# Patient Record
Sex: FEMALE | Race: BLACK OR AFRICAN AMERICAN | Employment: UNEMPLOYED | URBAN - METROPOLITAN AREA
[De-identification: names, ages, dates, MRNs, and addresses within clinical notes are randomized per-mention and may not be internally consistent; named-entity substitution may affect disease eponyms.]

---

## 2022-10-07 ENCOUNTER — OFFICE VISIT (OUTPATIENT)
Dept: ORTHOPEDIC SURGERY | Age: 47
End: 2022-10-07
Payer: MEDICARE

## 2022-10-07 VITALS — BODY MASS INDEX: 31.34 KG/M2 | WEIGHT: 195 LBS | HEIGHT: 66 IN

## 2022-10-07 DIAGNOSIS — M17.0 OSTEOARTHRITIS OF BOTH KNEES, UNSPECIFIED OSTEOARTHRITIS TYPE: ICD-10-CM

## 2022-10-07 DIAGNOSIS — M25.562 PAIN IN BOTH KNEES, UNSPECIFIED CHRONICITY: Primary | ICD-10-CM

## 2022-10-07 DIAGNOSIS — M25.561 PAIN IN BOTH KNEES, UNSPECIFIED CHRONICITY: Primary | ICD-10-CM

## 2022-10-07 PROCEDURE — 20611 DRAIN/INJ JOINT/BURSA W/US: CPT | Performed by: ORTHOPAEDIC SURGERY

## 2022-10-07 PROCEDURE — G8432 DEP SCR NOT DOC, RNG: HCPCS | Performed by: ORTHOPAEDIC SURGERY

## 2022-10-07 PROCEDURE — 99203 OFFICE O/P NEW LOW 30 MIN: CPT | Performed by: ORTHOPAEDIC SURGERY

## 2022-10-07 PROCEDURE — G8427 DOCREV CUR MEDS BY ELIG CLIN: HCPCS | Performed by: ORTHOPAEDIC SURGERY

## 2022-10-07 PROCEDURE — G8417 CALC BMI ABV UP PARAM F/U: HCPCS | Performed by: ORTHOPAEDIC SURGERY

## 2022-10-07 RX ORDER — TRIAMCINOLONE ACETONIDE 40 MG/ML
40 INJECTION, SUSPENSION INTRA-ARTICULAR; INTRAMUSCULAR ONCE
Status: COMPLETED | OUTPATIENT
Start: 2022-10-07 | End: 2022-10-07

## 2022-10-07 RX ORDER — LIDOCAINE HYDROCHLORIDE 10 MG/ML
9 INJECTION INFILTRATION; PERINEURAL ONCE
Status: COMPLETED | OUTPATIENT
Start: 2022-10-07 | End: 2022-10-07

## 2022-10-07 RX ORDER — DICLOFENAC SODIUM 10 MG/G
GEL TOPICAL
COMMUNITY
Start: 2022-09-07

## 2022-10-07 RX ADMIN — TRIAMCINOLONE ACETONIDE 40 MG: 40 INJECTION, SUSPENSION INTRA-ARTICULAR; INTRAMUSCULAR at 16:00

## 2022-10-07 RX ADMIN — LIDOCAINE HYDROCHLORIDE 9 ML: 10 INJECTION INFILTRATION; PERINEURAL at 16:00

## 2022-10-07 NOTE — LETTER
Patricia Louis   1975   [id number]       10/7/2022       I hereby authorize and direct Billy Cruz MD, Niya Cho, and whomever he may designate as his associate to perform upon myself the following procedure:    Injection of: Kenalog,  BILAT KNEE #3  If any unforeseen condition arises in the course of the procedure, I further authorize him and his associated and/or assistant(s) to do whatever he/she deems advisable. The nature, purpose, benefits, risks, side effects, likelihood of achieving goals, and potential problems that might occur during recuperation, risks for not receiving the proposed care, treatment and services and alternatives of the procedure have been fully explained to me by my physician including, but not limited to:    Swelling, joint pain, skin pigment changes, worsening of condition, and failure to improve. I acknowledge that no guarantee or assurance has been made to me as to the results that may be obtained or the likelihood of success.                 _______________________________________     Signature of patient or authorized representative                [employer] [facility] fax: [phone]

## 2022-10-07 NOTE — PROGRESS NOTES
Name: Elda Blanco    : 1975     Service Dept: [address] [place] [address] and Sports Medicine    Chief Complaint   Patient presents with    Knee Pain        Visit Vitals  Ht 5' 6\" (1.676 m)   Wt 195 lb (88.5 kg)   BMI 31.47 kg/m²        Allergies   Allergen Reactions    Cymbalta [Duloxetine] Nausea and Vomiting        Current Outpatient Medications   Medication Sig Dispense Refill    diclofenac (VOLTAREN) 1 % gel APPLY 4 GRAMS TOPICALLY TO AFFECTED AREA 4 TIMES DAILY      oxyCODONE IR (ROXICODONE) 10 mg tab immediate release tablet Take  by mouth.      pregabalin (LYRICA) 50 mg capsule Take  by mouth.      ergocalciferol (ERGOCALCIFEROL) 50,000 unit capsule Take 50,000 Units by mouth.      zolpidem (AMBIEN) 10 mg tablet Take  by mouth nightly as needed for Sleep. potassium 99 mg tablet Take 99 mg by mouth daily. Current Facility-Administered Medications   Medication Dose Route Frequency Provider Last Rate Last Admin    [COMPLETED] lidocaine (XYLOCAINE) 10 mg/mL (1 %) injection 9 mL  9 mL Other ONCE Billy Strickland MD   9 mL at 10/07/22 1600    [COMPLETED] triamcinolone acetonide (KENALOG-40) 40 mg/mL injection 40 mg  40 mg Intra artICUlar ONCE Billy Strickland MD   40 mg at 10/07/22 1600    [COMPLETED] lidocaine (XYLOCAINE) 10 mg/mL (1 %) injection 9 mL  9 mL Other ONCE Billy Strickland MD   9 mL at 10/07/22 1600    [COMPLETED] triamcinolone acetonide (KENALOG-40) 40 mg/mL injection 40 mg  40 mg Intra artICUlar ONCE Billy Strickland MD   40 mg at 10/07/22 1600      There is no problem list on file for this patient.      Family History   Problem Relation Age of Onset    Cancer Father         prostate    Other Mother         graves    High Cholesterol Mother     OSTEOARTHRITIS Mother     OSTEOARTHRITIS Sister     OSTEOARTHRITIS Father     Thyroid Disease Mother     Rashes/Skin Problems Sister       Social History     Socioeconomic History    Marital status:    Tobacco Use Smoking status: Former     Types: Cigarettes     Quit date: 1/1/2012     Years since quitting: 10.7    Smokeless tobacco: Never   Substance and Sexual Activity    Alcohol use: Yes     Alcohol/week: 0.0 standard drinks     Comment: socially    Drug use: No    Sexual activity: Yes     Partners: Male     Birth control/protection: None      Past Surgical History:   Procedure Laterality Date    HX HYSTERECTOMY  2010      Past Medical History:   Diagnosis Date    Arthritis         I have reviewed and agree with [address] [facility] and ROS and intake form in chart and the record furthermore I have reviewed prior medical record(s) regarding this patients care during this appointment. Review of Systems:   Patient is a pleasant appearing individual, appropriately dressed, well hydrated, well nourished, who is alert, appropriately oriented for age, and in no acute distress with a normal gait and normal affect who does not appear to be in any significant pain. Physical Exam:  Left Knee -Decrease range of motion with flexion, Knee arc of greater than 50 degrees, Some crepitation, Grossly neurovascularly intact, Good cap refill, No skin lesion, Moderate swelling, some gross instability, Some quadriceps weakness Kellgren and Tera at least grade 3    Right Knee -Decrease range of motion with flexion, Some crepitation, Grossly neurovascularly intact, Good cap refill, No skin lesion, Moderate swelling, some gross instability, Some quadriceps weaknessKellgren and Tera at least grade 3     Procedure Documentation:    I discussed in detail the risks, benefits and complications of an injection which included but are not limited to infection, skin reactions, hot swollen joint, and anaphylaxis with the patient. The patient verbalized understanding and gave informed consent for the injection. The patient's knees were flexed to 90° and the skin prepped using sterile alcohol solution.  A sterile needle was inserted into the bilateral knee(s) and the mixture of 9 mL Lidocaine 1%, 1 mL Kenalog 40 mg was injected under sterile technique. The needle was withdrawn and the puncture site sealed with a Band-Aid. Technique: Under sterile conditions a [employer] ultrasound unit with a variable frequency (7.0-14.0 MHz) linear transducer was used to localize the placement of needle into the bilateral knee(s) joint. Findings: Successful needle placement for knee injection. Final images were taken and saved for permanent record. The patient tolerated the injection well. The patient was instructed to call the office immediately if there is any pain, redness, warmth, fever, or chills. Encounter Diagnoses     ICD-10-CM ICD-9-CM   1. Pain in both knees, unspecified chronicity  M25.561 719.46    M25.562    2. Osteoarthritis of both knees, unspecified osteoarthritis type  M17.0 715.96       HPI:  The patient is here with a chief complaint of bilateral knee pain, throbbing burning pain, progressively getting worse. Pain is 9/10. X-rays of the bilateral knees are positive for OA, severe in nature. Assessment/Plan:  Plan would be for cortisone injection in both knees. We will see her back in a few weeks. If she is not better, we will consider viscosupplementation. If that does not help, she will be a candidate for knee replacement even though she is slightly young, and we will go from there. As part of continued conservative pain management options the patient was advised to utilize Tylenol or OTC NSAIDS as long as it is not medically contraindicated. Return to Office: Follow-up and Dispositions    Return in about 6 weeks (around 11/18/2022).         Administrations This Visit       lidocaine (XYLOCAINE) 10 mg/mL (1 %) injection 9 mL       Admin Date  10/07/2022 Action  Given Dose  9 mL Route  Other Administered By  Yoanna Johnson LPN      Admin Date  10/07/2022 Action  Given Dose  9 mL Route  Other Administered By  Yoanna Johnson LPN triamcinolone acetonide (KENALOG-40) 40 mg/mL injection 40 mg       Admin Date  10/07/2022 Action  Given Dose  40 mg Route  Intra artICUlar Administered By  Alejandra Aquino LPN      Admin Date  10/07/2022 Action  Given Dose  40 mg Route  Intra artICUlar Administered By  Alejandra Aquino LPN                   Scribed by Niko Mcmillan LPN as dictated by [facility] OLIVIA Hager MD.  Documentation True and Accepted Billy Hager MD

## 2022-10-07 NOTE — Clinical Note
10/10/2022    Patient: Joshua Gatica   YOB: 1975   Date of Visit: 10/7/2022     Lucas Pimentel DO  Via     Dear Lucas Pimentel DO,      Thank you for referring Ms. Emanuel Silvestre to [address] [place] [address] for evaluation. My notes for this consultation are attached. If you have questions, please do not hesitate to call me. I look forward to following your patient along with you.       Sincerely,    Jerome Andrade MD

## 2022-10-21 ENCOUNTER — OFFICE VISIT (OUTPATIENT)
Dept: ORTHOPEDIC SURGERY | Age: 47
End: 2022-10-21
Payer: MEDICARE

## 2022-10-21 DIAGNOSIS — M17.0 OSTEOARTHRITIS OF BOTH KNEES, UNSPECIFIED OSTEOARTHRITIS TYPE: Primary | ICD-10-CM

## 2022-10-21 PROCEDURE — 99213 OFFICE O/P EST LOW 20 MIN: CPT | Performed by: ORTHOPAEDIC SURGERY

## 2022-10-21 PROCEDURE — G8432 DEP SCR NOT DOC, RNG: HCPCS | Performed by: ORTHOPAEDIC SURGERY

## 2022-10-21 PROCEDURE — G8417 CALC BMI ABV UP PARAM F/U: HCPCS | Performed by: ORTHOPAEDIC SURGERY

## 2022-10-21 PROCEDURE — G8427 DOCREV CUR MEDS BY ELIG CLIN: HCPCS | Performed by: ORTHOPAEDIC SURGERY

## 2022-10-21 RX ORDER — VALACYCLOVIR HYDROCHLORIDE 500 MG/1
TABLET, FILM COATED ORAL
COMMUNITY
Start: 2022-10-13 | End: 2022-11-11 | Stop reason: ALTCHOICE

## 2022-10-21 NOTE — PROGRESS NOTES
Name: Lissett Solomon    : 1975     Service Dept: [address] [facility] and Sports Medicine    Chief Complaint   Patient presents with    Knee Pain        There were no vitals taken for this visit. Allergies   Allergen Reactions    Cymbalta [Duloxetine] Nausea and Vomiting        Current Outpatient Medications   Medication Sig Dispense Refill    valACYclovir (VALTREX) 500 mg tablet TAKE 1 TABLET BY MOUTH TWICE A DAY FOR 5 DAYS      diclofenac (VOLTAREN) 1 % gel APPLY 4 GRAMS TOPICALLY TO AFFECTED AREA 4 TIMES DAILY      oxyCODONE IR (ROXICODONE) 10 mg tab immediate release tablet Take  by mouth.      pregabalin (LYRICA) 50 mg capsule Take  by mouth.      ergocalciferol (ERGOCALCIFEROL) 50,000 unit capsule Take 50,000 Units by mouth.      zolpidem (AMBIEN) 10 mg tablet Take  by mouth nightly as needed for Sleep. potassium 99 mg tablet Take 99 mg by mouth daily. There is no problem list on file for this patient. Family History   Problem Relation Age of Onset    Cancer Father         prostate    Other Mother         graves    High Cholesterol Mother     OSTEOARTHRITIS Mother     OSTEOARTHRITIS Sister     OSTEOARTHRITIS Father     Thyroid Disease Mother     Rashes/Skin Problems Sister       Social History     Socioeconomic History    Marital status:    Tobacco Use    Smoking status: Former     Types: Cigarettes     Quit date: 2012     Years since quitting: 10.8    Smokeless tobacco: Never   Substance and Sexual Activity    Alcohol use:  Yes     Alcohol/week: 0.0 standard drinks     Comment: socially    Drug use: No    Sexual activity: Yes     Partners: Male     Birth control/protection: None      Past Surgical History:   Procedure Laterality Date    HX HYSTERECTOMY        Past Medical History:   Diagnosis Date    Arthritis         I have reviewed and agree with [address] [facility] and YUDY and intake form in chart and the record furthermore I have reviewed prior medical record(s) regarding this patients care during this appointment. Review of Systems:   Patient is a pleasant appearing individual, appropriately dressed, well hydrated, well nourished, who is alert, appropriately oriented for age, and in no acute distress with a normal gait and normal affect who does not appear to be in any significant pain. Physical Exam:  Left Knee -Decrease range of motion with flexion, Knee arc of greater than 50 degrees, Some crepitation, Grossly neurovascularly intact, Good cap refill, No skin lesion, Moderate swelling, some gross instability, Some quadriceps weakness Kellgren and Tera at least grade 3    Right Knee -Decrease range of motion with flexion, Some crepitation, Grossly neurovascularly intact, Good cap refill, No skin lesion, Moderate swelling, some gross instability, Some quadriceps weaknessKellgren and Tera at least grade 3    Encounter Diagnoses     ICD-10-CM ICD-9-CM   1. Osteoarthritis of both knees, unspecified osteoarthritis type  M17.0 715.96       HPI:  The patient is here with a chief complaint of bilateral knee pain, diagnosed with severe OA. Disproportionate for age. Pain is 8/10. Shots did not help. Assessment/Plan:  Plan at this point, we will get her approved for Supartz approval.  If that does not help, we talk about possible evaluation for knee replacement and go from there. As part of continued conservative pain management options the patient was advised to utilize Tylenol or OTC NSAIDS as long as it is not medically contraindicated. Return to Office: Follow-up and Dispositions    Return for post supartz approval.           Scribed by He García LPN as dictated by [facility] [place] OLIVIA Ortega MD.  Documentation True and Accepted Billy Ortega MD

## 2022-10-21 NOTE — PATIENT INSTRUCTIONS

## 2022-10-21 NOTE — Clinical Note
10/24/2022    Patient: Tiffany Montgomery   YOB: 1975   Date of Visit: 10/21/2022     Dario Castillo DO  Via     Dear Dario Castillo DO,      Thank you for referring Ms. Trudy Andrade to [address] [place] [address] for evaluation. My notes for this consultation are attached. If you have questions, please do not hesitate to call me. I look forward to following your patient along with you.       Sincerely,    Irma Cruz MD

## 2022-11-11 ENCOUNTER — OFFICE VISIT (OUTPATIENT)
Dept: ORTHOPEDIC SURGERY | Age: 47
End: 2022-11-11
Payer: MEDICARE

## 2022-11-11 DIAGNOSIS — M17.0 OSTEOARTHRITIS OF BOTH KNEES, UNSPECIFIED OSTEOARTHRITIS TYPE: Primary | ICD-10-CM

## 2022-11-11 DIAGNOSIS — M25.561 PAIN IN BOTH KNEES, UNSPECIFIED CHRONICITY: ICD-10-CM

## 2022-11-11 DIAGNOSIS — M25.562 PAIN IN BOTH KNEES, UNSPECIFIED CHRONICITY: ICD-10-CM

## 2022-11-11 PROCEDURE — 20611 DRAIN/INJ JOINT/BURSA W/US: CPT | Performed by: ORTHOPAEDIC SURGERY

## 2022-11-11 RX ORDER — VALACYCLOVIR HYDROCHLORIDE 1 G/1
TABLET, FILM COATED ORAL
COMMUNITY
Start: 2022-11-09

## 2022-11-11 NOTE — PATIENT INSTRUCTIONS

## 2022-11-11 NOTE — PROGRESS NOTES
Name: Paulino Monique    : 1975     Service Dept: [address] [facility] and Sports Medicine    Chief Complaint   Patient presents with    Injection     1st Orthovisc Bilateral Knee        There were no vitals taken for this visit. Allergies   Allergen Reactions    Cymbalta [Duloxetine] Nausea and Vomiting        Current Outpatient Medications   Medication Sig Dispense Refill    valACYclovir (VALTREX) 1 gram tablet       diclofenac (VOLTAREN) 1 % gel APPLY 4 GRAMS TOPICALLY TO AFFECTED AREA 4 TIMES DAILY      oxyCODONE IR (ROXICODONE) 10 mg tab immediate release tablet Take  by mouth.      pregabalin (LYRICA) 50 mg capsule Take  by mouth.      ergocalciferol (ERGOCALCIFEROL) 50,000 unit capsule Take 50,000 Units by mouth.      zolpidem (AMBIEN) 10 mg tablet Take  by mouth nightly as needed for Sleep. potassium 99 mg tablet Take 99 mg by mouth daily. Current Facility-Administered Medications   Medication Dose Route Frequency Provider Last Rate Last Admin    [COMPLETED] sodium hyaluronate (viscosup) (ORTHOVISC) 30 mg/2 mL injection syrg 30 mg  30 mg Intra artICUlar ONCE Billy Strickland MD   30 mg at 22 1500    [COMPLETED] sodium hyaluronate (viscosup) (ORTHOVISC) 30 mg/2 mL injection syrg 30 mg  30 mg Intra artICUlar ONCE Billy Strickland MD   30 mg at 22 1500      There is no problem list on file for this patient. Family History   Problem Relation Age of Onset    Cancer Father         prostate    Other Mother         graves    High Cholesterol Mother     OSTEOARTHRITIS Mother     OSTEOARTHRITIS Sister     OSTEOARTHRITIS Father     Thyroid Disease Mother     Rashes/Skin Problems Sister       Social History     Socioeconomic History    Marital status:    Tobacco Use    Smoking status: Former     Types: Cigarettes     Quit date: 2012     Years since quitting: 10.8    Smokeless tobacco: Never   Substance and Sexual Activity    Alcohol use:  Yes Alcohol/week: 0.0 standard drinks     Comment: socially    Drug use: No    Sexual activity: Yes     Partners: Male     Birth control/protection: None      Past Surgical History:   Procedure Laterality Date    HX HYSTERECTOMY  2010      Past Medical History:   Diagnosis Date    Arthritis         I have reviewed and agree with [address] [facility] and YUDY and intake form in chart and the record furthermore I have reviewed prior medical record(s) regarding this patients care during this appointment. Review of Systems:   Patient is a pleasant appearing individual, appropriately dressed, well hydrated, well nourished, who is alert, appropriately oriented for age, and in no acute distress with a normal gait and normal affect who does not appear to be in any significant pain. Physical Exam:  Left Knee -Decrease range of motion with flexion, Knee arc of greater than 50 degrees, Some crepitation, Grossly neurovascularly intact, Good cap refill, No skin lesion, Moderate swelling, some gross instability, Some quadriceps weakness Kellgren and Tera at least grade 3    Right Knee -Decrease range of motion with flexion, Some crepitation, Grossly neurovascularly intact, Good cap refill, No skin lesion, Moderate swelling, some gross instability, Some quadriceps weaknessKellgren and Tera at least grade 3     Procedure Documentation:    I discussed in detail the risks, benefits and complications of an injection which included but are not limited to infection, skin reactions, hot swollen joint, and anaphylaxis with the patient. The patient verbalized understanding and gave informed consent for the injection. The patient's bilateral knee(s) were flexed to 90° and the skin prepped using sterile alcohol solution. A sterile needle was inserted into bilateral knee(s) and Orthovisc 15 mg, 2 mL syringe was injected under sterile technique. The needle was withdrawn and the puncture site sealed with a Band-Aid.       Technique: Under sterile conditions a [employer] ultrasound unit with a variable frequency (7.0-14.0 MHz) linear transducer was used to localize the placement of needle into the bilateral knee(s) joint. Findings: Successful needle placement for knee injection. Final images were taken and saved for permanent record. The patient tolerated the injection well. The patient was instructed to call the office immediately if there is any pain, redness, warmth, fever, or chills. Encounter Diagnoses     ICD-10-CM ICD-9-CM   1. Osteoarthritis of both knees, unspecified osteoarthritis type  M17.0 715.96   2. Pain in both knees, unspecified chronicity  M25.561 719.46    M25.562        Assessment/Plan: We are going to go ahead and inject bilateral knee with the first Orthovisc. See the patient back in a week for second Orthovisc in bilateral knees and go from there. As part of continued conservative pain management options the patient was advised to utilize Tylenol or OTC NSAIDS as long as it is not medically contraindicated. Return to Office: Follow-up and Dispositions    Return in about 1 week (around 11/18/2022) for 2nd bilateral orthovisc . Administrations This Visit       sodium hyaluronate (viscosup) (ORTHOVISC) 30 mg/2 mL injection syrg 30 mg       Admin Date  11/11/2022 Action  Given Dose  30 mg Route  Intra artICUlar Administered By  Angie Mackey LPN      Admin Date  11/11/2022 Action  Given Dose  30 mg Route  Intra artICUlar Administered By  Angie Mackey LPN                   Scribed by Patrica Shay LPN as dictated by [facility] OLIVIA Sutherland MD.  Documentation True and Accepted Billy Sutherland MD

## 2022-11-11 NOTE — Clinical Note
11/14/2022    Patient: Joshua Gatica   YOB: 1975   Date of Visit: 11/11/2022     Lucas Pimentel DO  Via     Dear Lucas Pimentel DO,      Thank you for referring Ms. Emanuel Silvestre to [address] [place] [address] for evaluation. My notes for this consultation are attached. If you have questions, please do not hesitate to call me. I look forward to following your patient along with you.       Sincerely,    Jerome Andrade MD

## 2022-11-18 ENCOUNTER — OFFICE VISIT (OUTPATIENT)
Dept: ORTHOPEDIC SURGERY | Age: 47
End: 2022-11-18
Payer: MEDICARE

## 2022-11-18 DIAGNOSIS — M25.562 PAIN IN BOTH KNEES, UNSPECIFIED CHRONICITY: ICD-10-CM

## 2022-11-18 DIAGNOSIS — M25.561 PAIN IN BOTH KNEES, UNSPECIFIED CHRONICITY: ICD-10-CM

## 2022-11-18 DIAGNOSIS — M17.0 OSTEOARTHRITIS OF BOTH KNEES, UNSPECIFIED OSTEOARTHRITIS TYPE: Primary | ICD-10-CM

## 2022-11-18 PROCEDURE — 20611 DRAIN/INJ JOINT/BURSA W/US: CPT | Performed by: ORTHOPAEDIC SURGERY

## 2022-11-18 NOTE — Clinical Note
11/21/2022    Patient: Hailey Hamilton   YOB: 1975   Date of Visit: 11/18/2022     Della Valverde DO  Via     Dear Della Valverde DO,      Thank you for referring Ms. Manny Heath to [address] [place] [address] [facility] for evaluation. My notes for this consultation are attached. If you have questions, please do not hesitate to call me. I look forward to following your patient along with you.       Sincerely,    Uriel Allan MD

## 2022-11-18 NOTE — PATIENT INSTRUCTIONS

## 2022-11-18 NOTE — LETTER
Wing Quarles   1975   [id number]       11/18/2022       I hereby authorize and direct Billy Bain MD, Ginny Hunter, and whomever he may designate as his associate to perform upon myself the following procedure:    Injection of: Kenalog, Supartz, Euflexxa, Orthovisc in the Right/Left ____________________. If any unforeseen condition arises in the course of the procedure, I further authorize him and his associated and/or assistant(s) to do whatever he/she deems advisable. The nature, purpose, benefits, risks, side effects, likelihood of achieving goals, and potential problems that might occur during recuperation, risks for not receiving the proposed care, treatment and services and alternatives of the procedure have been fully explained to me by my physician including, but not limited to:    Swelling, joint pain, skin pigment changes, worsening of condition, and failure to improve. I acknowledge that no guarantee or assurance has been made to me as to the results that may be obtained or the likelihood of success.                 _______________________________________     Signature of patient or authorized representative                [employer] and [facility] Medicine fax: [phone]

## 2022-11-18 NOTE — PROGRESS NOTES
Name: Wilmer Fonseca    : 1975     Service Dept: [address] [place] [address] and Sports Medicine    Chief Complaint   Patient presents with    Knee Pain        There were no vitals taken for this visit. Allergies   Allergen Reactions    Cymbalta [Duloxetine] Nausea and Vomiting        Current Outpatient Medications   Medication Sig Dispense Refill    valACYclovir (VALTREX) 1 gram tablet       diclofenac (VOLTAREN) 1 % gel APPLY 4 GRAMS TOPICALLY TO AFFECTED AREA 4 TIMES DAILY      oxyCODONE IR (ROXICODONE) 10 mg tab immediate release tablet Take  by mouth.      pregabalin (LYRICA) 50 mg capsule Take  by mouth.      ergocalciferol (ERGOCALCIFEROL) 50,000 unit capsule Take 50,000 Units by mouth.      zolpidem (AMBIEN) 10 mg tablet Take  by mouth nightly as needed for Sleep. potassium 99 mg tablet Take 99 mg by mouth daily. Current Facility-Administered Medications   Medication Dose Route Frequency Provider Last Rate Last Admin    [COMPLETED] sodium hyaluronate (viscosup) (ORTHOVISC) 30 mg/2 mL injection syrg 30 mg  30 mg Intra artICUlar ONCE Billy Strickland MD   30 mg at 22 1500    [COMPLETED] sodium hyaluronate (viscosup) (ORTHOVISC) 30 mg/2 mL injection syrg 30 mg  30 mg Intra artICUlar ONCE Billy Strickland MD   30 mg at 22 1500      There is no problem list on file for this patient. Family History   Problem Relation Age of Onset    Cancer Father         prostate    Other Mother         graves    High Cholesterol Mother     OSTEOARTHRITIS Mother     OSTEOARTHRITIS Sister     OSTEOARTHRITIS Father     Thyroid Disease Mother     Rashes/Skin Problems Sister       Social History     Socioeconomic History    Marital status:    Tobacco Use    Smoking status: Former     Types: Cigarettes     Quit date: 2012     Years since quitting: 10.8    Smokeless tobacco: Never   Substance and Sexual Activity    Alcohol use:  Yes     Alcohol/week: 0.0 standard drinks Comment: socially    Drug use: No    Sexual activity: Yes     Partners: Male     Birth control/protection: None      Past Surgical History:   Procedure Laterality Date    HX HYSTERECTOMY  2010      Past Medical History:   Diagnosis Date    Arthritis         I have reviewed and agree with [address] and YUDY and intake form in chart and the record furthermore I have reviewed prior medical record(s) regarding this patients care during this appointment. Review of Systems:   Patient is a pleasant appearing individual, appropriately dressed, well hydrated, well nourished, who is alert, appropriately oriented for age, and in no acute distress with a normal gait and normal affect who does not appear to be in any significant pain. Physical Exam:  Left Knee -Decrease range of motion with flexion, Knee arc of greater than 50 degrees, Some crepitation, Grossly neurovascularly intact, Good cap refill, No skin lesion, Moderate swelling, some gross instability, Some quadriceps weakness Kellgren and Tera at least grade 3    Right Knee -Decrease range of motion with flexion, Some crepitation, Grossly neurovascularly intact, Good cap refill, No skin lesion, Moderate swelling, some gross instability, Some quadriceps weaknessKellgren and Tera at least grade 3     Procedure Documentation:    I discussed in detail the risks, benefits and complications of an injection which included but are not limited to infection, skin reactions, hot swollen joint, and anaphylaxis with the patient. The patient verbalized understanding and gave informed consent for the injection. The patient's bilateral knee(s) were flexed to 90° and the skin prepped using sterile alcohol solution. A sterile needle was inserted into bilateral knee(s) and Orthovisc 15 mg, 2 mL syringe was injected under sterile technique. The needle was withdrawn and the puncture site sealed with a Band-Aid.       Technique: Under sterile conditions a [employer] ultrasound unit with a variable frequency (7.0-14.0 MHz) linear transducer was used to localize the placement of needle into the bilateral knee(s) joint. Findings: Successful needle placement for knee injection. Final images were taken and saved for permanent record. The patient tolerated the injection well. The patient was instructed to call the office immediately if there is any pain, redness, warmth, fever, or chills. Encounter Diagnoses     ICD-10-CM ICD-9-CM   1. Osteoarthritis of both knees, unspecified osteoarthritis type  M17.0 715.96   2. Pain in both knees, unspecified chronicity  M25.561 719.46    M25.562        Assessment/Plan: We are going to go ahead and inject bilateral knees with 2nd Orthovisc. See the patient back in a week for 3rd Orthovisc bilateral knees and go from there. As part of continued conservative pain management options the patient was advised to utilize Tylenol or OTC NSAIDS as long as it is not medically contraindicated. Return to Office: Follow-up and Dispositions    Return in about 1 week (around 11/25/2022) for 3rd bilateral orthovisc. Administrations This Visit       sodium hyaluronate (viscosup) (ORTHOVISC) 30 mg/2 mL injection syrg 30 mg       Admin Date  11/18/2022 Action  Given Dose  30 mg Route  Intra artICUlar Administered By  Edwin Dorantes LPN      Admin Date  11/18/2022 Action  Given Dose  30 mg Route  Intra artICUlar Administered By  Edwin Dorantes LPN                   Scribed by Maria C Winter LPN as dictated by [facility] [place] OLIVIA Alvarado MD.  Documentation True and Accepted Billy Alvarado MD

## 2022-11-18 NOTE — LETTER
Sarah Rigoberto   1975   [id number]       11/18/2022       I hereby authorize and direct Billy Martines MD, Maddison Weston, and whomever he may designate as his associate to perform upon myself the following procedure:    Injection of: Kenalog, Supartz, Euflexxa, Orthovisc in the Right/Left ____________________. If any unforeseen condition arises in the course of the procedure, I further authorize him and his associated and/or assistant(s) to do whatever he/she deems advisable. The nature, purpose, benefits, risks, side effects, likelihood of achieving goals, and potential problems that might occur during recuperation, risks for not receiving the proposed care, treatment and services and alternatives of the procedure have been fully explained to me by my physician including, but not limited to:    Swelling, joint pain, skin pigment changes, worsening of condition, and failure to improve. I acknowledge that no guarantee or assurance has been made to me as to the results that may be obtained or the likelihood of success.                 _______________________________________     Signature of patient or authorized representative                [employer] and Sports Medicine fax: [phone]

## 2022-12-02 ENCOUNTER — OFFICE VISIT (OUTPATIENT)
Dept: ORTHOPEDIC SURGERY | Age: 47
End: 2022-12-02
Payer: MEDICARE

## 2022-12-02 DIAGNOSIS — M25.562 PAIN IN BOTH KNEES, UNSPECIFIED CHRONICITY: ICD-10-CM

## 2022-12-02 DIAGNOSIS — M17.0 OSTEOARTHRITIS OF BOTH KNEES, UNSPECIFIED OSTEOARTHRITIS TYPE: Primary | ICD-10-CM

## 2022-12-02 DIAGNOSIS — M25.561 PAIN IN BOTH KNEES, UNSPECIFIED CHRONICITY: ICD-10-CM

## 2022-12-02 RX ORDER — VALACYCLOVIR HYDROCHLORIDE 500 MG/1
TABLET, FILM COATED ORAL
COMMUNITY
Start: 2022-11-28

## 2022-12-02 NOTE — PATIENT INSTRUCTIONS

## 2022-12-02 NOTE — PROGRESS NOTES
Name: Gennaro Coker    : 1975     Service Dept: [address] [place] [address] and Sports Medicine    Chief Complaint   Patient presents with    Knee Pain        There were no vitals taken for this visit. Allergies   Allergen Reactions    Cymbalta [Duloxetine] Nausea and Vomiting        Current Outpatient Medications   Medication Sig Dispense Refill    valACYclovir (VALTREX) 500 mg tablet       valACYclovir (VALTREX) 1 gram tablet       diclofenac (VOLTAREN) 1 % gel APPLY 4 GRAMS TOPICALLY TO AFFECTED AREA 4 TIMES DAILY      oxyCODONE IR (ROXICODONE) 10 mg tab immediate release tablet Take  by mouth.      pregabalin (LYRICA) 50 mg capsule Take  by mouth.      ergocalciferol (ERGOCALCIFEROL) 50,000 unit capsule Take 50,000 Units by mouth.      zolpidem (AMBIEN) 10 mg tablet Take  by mouth nightly as needed for Sleep. potassium 99 mg tablet Take 99 mg by mouth daily. Current Facility-Administered Medications   Medication Dose Route Frequency Provider Last Rate Last Admin    [COMPLETED] sodium hyaluronate (viscosup) (ORTHOVISC) 30 mg/2 mL injection syrg 30 mg  30 mg Intra artICUlar ONCE Billy Strickland MD   30 mg at 22 1500    [COMPLETED] sodium hyaluronate (viscosup) (ORTHOVISC) 30 mg/2 mL injection syrg 30 mg  30 mg Intra artICUlar ONCE Billy Strickland MD   30 mg at 22 1500      There is no problem list on file for this patient. Family History   Problem Relation Age of Onset    Cancer Father         prostate    Other Mother         graves    High Cholesterol Mother     OSTEOARTHRITIS Mother     OSTEOARTHRITIS Sister     OSTEOARTHRITIS Father     Thyroid Disease Mother     Rashes/Skin Problems Sister       Social History     Socioeconomic History    Marital status:    Tobacco Use    Smoking status: Former     Types: Cigarettes     Quit date: 2012     Years since quitting: 10.9    Smokeless tobacco: Never   Substance and Sexual Activity    Alcohol use:  Yes Alcohol/week: 0.0 standard drinks     Comment: socially    Drug use: No    Sexual activity: Yes     Partners: Male     Birth control/protection: None      Past Surgical History:   Procedure Laterality Date    HX HYSTERECTOMY  2010      Past Medical History:   Diagnosis Date    Arthritis         I have reviewed and agree with [address] [facility] [address] and YUDY and intake form in chart and the record furthermore I have reviewed prior medical record(s) regarding this patients care during this appointment. Review of Systems:   Patient is a pleasant appearing individual, appropriately dressed, well hydrated, well nourished, who is alert, appropriately oriented for age, and in no acute distress with a normal gait and normal affect who does not appear to be in any significant pain. Physical Exam:  Left Knee -Decrease range of motion with flexion, Knee arc of greater than 50 degrees, Some crepitation, Grossly neurovascularly intact, Good cap refill, No skin lesion, Moderate swelling, some gross instability, Some quadriceps weakness Kellgren and Tera at least grade 3    Right Knee -Decrease range of motion with flexion, Some crepitation, Grossly neurovascularly intact, Good cap refill, No skin lesion, Moderate swelling, some gross instability, Some quadriceps weaknessKellgren and Tera at least grade 3     Procedure Documentation:    I discussed in detail the risks, benefits and complications of an injection which included but are not limited to infection, skin reactions, hot swollen joint, and anaphylaxis with the patient. The patient verbalized understanding and gave informed consent for the injection. The patient's bilateral knee(s) were flexed to 90° and the skin prepped using sterile alcohol solution. A sterile needle was inserted into bilateral knee(s) and Orthovisc 15 mg, 2 mL syringe was injected under sterile technique. The needle was withdrawn and the puncture site sealed with a Band-Aid.       Technique: Under sterile conditions a [employer] ultrasound unit with a variable frequency (7.0-14.0 MHz) linear transducer was used to localize the placement of needle into the bilateral knee(s) joint. Findings: Successful needle placement for knee injection. Final images were taken and saved for permanent record. The patient tolerated the injection well. The patient was instructed to call the office immediately if there is any pain, redness, warmth, fever, or chills. Encounter Diagnoses     ICD-10-CM ICD-9-CM   1. Osteoarthritis of both knees, unspecified osteoarthritis type  M17.0 715.96   2. Pain in both knees, unspecified chronicity  M25.561 719.46    M25.562        HPI:  The patient is here with a chief complaint of bilateral knee pain, throbbing burning pain. Assessment/Plan: We are going to go ahead and inject bilateral knee with third Orthovisc. We will see the patient back in a week for fourth Orthovisc in bilateral knees. As part of continued conservative pain management options the patient was advised to utilize Tylenol or OTC NSAIDS as long as it is not medically contraindicated. Return to Office: Follow-up and Dispositions    Return in about 1 week (around 12/9/2022) for 4th orthovisc bilateral knee. Administrations This Visit       sodium hyaluronate (viscosup) (ORTHOVISC) 30 mg/2 mL injection syrg 30 mg       Admin Date  12/02/2022 Action  Given Dose  30 mg Route  Intra artICUlar Administered By  Angy Jama LPN      Admin Date  12/02/2022 Action  Given Dose  30 mg Route  Intra artICUlar Administered By  Angy Jama LPN                   Scribed by Melida Michelle LPN as dictated by [facility] [place] OLIVIA Sanches MD.  Documentation True and Accepted Billy Sanches MD

## 2022-12-02 NOTE — Clinical Note
12/7/2022    Patient: Kay Mcnally   YOB: 1975   Date of Visit: 12/2/2022     Miko Harrington DO  Via     Dear Miko Harrington DO,      Thank you for referring Ms. Farideh Soriano to [address] [place] [address] for evaluation. My notes for this consultation are attached. If you have questions, please do not hesitate to call me. I look forward to following your patient along with you.       Sincerely,    Brisa Xavier MD
28.9

## 2022-12-02 NOTE — LETTER
Patricia Louis   1975   [id number]       12/2/2022       I hereby authorize and direct Billy Cruz MD, Niya Cho, and whomever he may designate as his associate to perform upon myself the following procedure:    Injection of: Orthovisc BL KNEE    If any unforeseen condition arises in the course of the procedure, I further authorize him and his associated and/or assistant(s) to do whatever he/she deems advisable. The nature, purpose, benefits, risks, side effects, likelihood of achieving goals, and potential problems that might occur during recuperation, risks for not receiving the proposed care, treatment and services and alternatives of the procedure have been fully explained to me by my physician including, but not limited to:    Swelling, joint pain, skin pigment changes, worsening of condition, and failure to improve. I acknowledge that no guarantee or assurance has been made to me as to the results that may be obtained or the likelihood of success.                 _______________________________________     Signature of patient or authorized representative                [employer] and Sports Medicine fax: [phone]

## 2022-12-13 ENCOUNTER — OFFICE VISIT (OUTPATIENT)
Dept: ORTHOPEDIC SURGERY | Age: 47
End: 2022-12-13
Payer: MEDICARE

## 2022-12-13 DIAGNOSIS — M17.12 PRIMARY OSTEOARTHRITIS OF LEFT KNEE: ICD-10-CM

## 2022-12-13 DIAGNOSIS — M17.11 PRIMARY OSTEOARTHRITIS OF RIGHT KNEE: Primary | ICD-10-CM

## 2022-12-13 NOTE — Clinical Note
12/14/2022    Patient: Gennaro Coker   YOB: 1975   Date of Visit: 12/13/2022     Yoli Lopez DO  Via     Dear Yoli Lopez DO,      Thank you for referring Ms. Xavier Hester to [address] [place] [address] for evaluation. My notes for this consultation are attached. If you have questions, please do not hesitate to call me. I look forward to following your patient along with you.       Sincerely,    Ruddy Atkins MD [place]

## 2022-12-13 NOTE — PROGRESS NOTES
Name: Babrara Jones    : 1975     Service Dept: [address] [place] [address] and Sports Medicine    Chief Complaint   Patient presents with    Knee Pain     4th Orthovisc B/L knee         There were no vitals taken for this visit. Allergies   Allergen Reactions    Cymbalta [Duloxetine] Nausea and Vomiting        Current Outpatient Medications   Medication Sig Dispense Refill    valACYclovir (VALTREX) 500 mg tablet       valACYclovir (VALTREX) 1 gram tablet       diclofenac (VOLTAREN) 1 % gel APPLY 4 GRAMS TOPICALLY TO AFFECTED AREA 4 TIMES DAILY      oxyCODONE IR (ROXICODONE) 10 mg tab immediate release tablet Take  by mouth.      pregabalin (LYRICA) 50 mg capsule Take  by mouth.      ergocalciferol (ERGOCALCIFEROL) 50,000 unit capsule Take 50,000 Units by mouth.      zolpidem (AMBIEN) 10 mg tablet Take  by mouth nightly as needed for Sleep. potassium 99 mg tablet Take 99 mg by mouth daily. Current Facility-Administered Medications   Medication Dose Route Frequency Provider Last Rate Last Admin    sodium hyaluronate (viscosup) (ORTHOVISC) 30 mg/2 mL injection syrg 30 mg  30 mg Intra artICUlar ONCE Billy Strickland MD        sodium hyaluronate (viscosup) (ORTHOVISC) 30 mg/2 mL injection syrg 30 mg  30 mg Intra artICUlar ONCE Billy Strickland MD          There is no problem list on file for this patient. Family History   Problem Relation Age of Onset    Cancer Father         prostate    Other Mother         graves    High Cholesterol Mother     OSTEOARTHRITIS Mother     OSTEOARTHRITIS Sister     OSTEOARTHRITIS Father     Thyroid Disease Mother     Rashes/Skin Problems Sister       Social History     Socioeconomic History    Marital status:    Tobacco Use    Smoking status: Former     Types: Cigarettes     Quit date: 2012     Years since quitting: 10.9    Smokeless tobacco: Never   Substance and Sexual Activity    Alcohol use:  Yes     Alcohol/week: 0.0 standard drinks Comment: socially    Drug use: No    Sexual activity: Yes     Partners: Male     Birth control/protection: None      Past Surgical History:   Procedure Laterality Date    HX HYSTERECTOMY  2010      Past Medical History:   Diagnosis Date    Arthritis         I have reviewed and agree with [address] and ROS and intake form in chart and the record furthermore I have reviewed prior medical record(s) regarding this patients care during this appointment. Review of Systems:   Patient is a pleasant appearing individual, appropriately dressed, well hydrated, well nourished, who is alert, appropriately oriented for age, and in no acute distress with a normal gait and normal affect who does not appear to be in any significant pain. Procedure Documentation:    I discussed in detail the risks, benefits and complications of an injection which included but are not limited to infection, skin reactions, hot swollen joint, and anaphylaxis with the patient. The patient verbalized understanding and gave informed consent for the injection. The patient's bilateral knee(s) were flexed to 90° and the skin prepped using sterile alcohol solution. A sterile needle was inserted into bilateral knee(s) and Orthovisc 15 mg, 2 mL syringe was injected under sterile technique. The needle was withdrawn and the puncture site sealed with a Band-Aid. Technique: Under sterile conditions a [employer] ultrasound unit with a variable frequency (7.0-14.0 MHz) linear transducer was used to localize the placement of needle into the bilateral knee(s) joint. Findings: Successful needle placement for knee injection. Final images were taken and saved for permanent record. The patient tolerated the injection well. The patient was instructed to call the office immediately if there is any pain, redness, warmth, fever, or chills. Encounter Diagnoses     ICD-10-CM ICD-9-CM   1. Primary osteoarthritis of right knee  M17.11 715.16   2.  Primary osteoarthritis of left knee  M17.12 715.16       Assessment/Plan: We are going to go ahead and inject fourth Orthovisc for both knees. We will see him back as needed and go from there. As part of continued conservative pain management options the patient was advised to utilize Tylenol or OTC NSAIDS as long as it is not medically contraindicated. Return to Office: Follow-up and Dispositions    Return if symptoms worsen or fail to improve. Scribed by Nate Mejias as dictated by Fadia Stallworth. Carroll Carrington MD.  Documentation True and Accepted Billy Carrington MD

## 2022-12-13 NOTE — LETTER
Lissett Solomon   1975   [id number]       12/13/2022       I hereby authorize and direct Billy Caballero MD, Henri Blancas, and whomever he may designate as his associate to perform upon myself the following procedure:    Injection of: 4th rm 4 Orthovisc bl knee    If any unforeseen condition arises in the course of the procedure, I further authorize him and his associated and/or assistant(s) to do whatever he/she deems advisable. The nature, purpose, benefits, risks, side effects, likelihood of achieving goals, and potential problems that might occur during recuperation, risks for not receiving the proposed care, treatment and services and alternatives of the procedure have been fully explained to me by my physician including, but not limited to:    Swelling, joint pain, skin pigment changes, worsening of condition, and failure to improve. I acknowledge that no guarantee or assurance has been made to me as to the results that may be obtained or the likelihood of success.                 _______________________________________     Signature of patient or authorized representative                [employer] and Sports Medicine fax: [phone]

## 2022-12-13 NOTE — PATIENT INSTRUCTIONS

## 2024-06-04 NOTE — PATIENT INSTRUCTIONS
Knee Arthritis: Care Instructions  Your Care Instructions     Knee arthritis is a breakdown of the cartilage that cushions your knee joint. When the cartilage wears down, your bones rub against each other. This causes pain and stiffness. Knee arthritis tends to get worse with time. Treatment for knee arthritis involves reducing pain, making the leg muscles stronger, and staying at a healthy body weight. The treatment usually does not improve the health of the cartilage, but it can reduce pain and improve how well your knee works. You can take simple measures to protect your knee joints, ease your pain, and help you stay active. Follow-up care is a key part of your treatment and safety. Be sure to make and go to all appointments, and call your doctor if you are having problems. It's also a good idea to know your test results and keep a list of the medicines you take. How can you care for yourself at home? Know that knee arthritis will cause more pain on some days than on others. Stay at a healthy weight. Lose weight if you are overweight. When you stand up, the pressure on your knees from every pound of body weight is multiplied four times. So if you lose 10 pounds, you will reduce the pressure on your knees by 40 pounds. Talk to your doctor or physical therapist about exercises that will help ease joint pain. Stretch to help prevent stiffness and to prevent injury before you exercise. You may enjoy gentle forms of yoga to help keep your knee joints and muscles flexible. Walk instead of jog. Ride a bike. This makes your thigh muscles stronger and takes pressure off your knee. Wear well-fitting and comfortable shoes. Exercise in chest-deep water. This can help you exercise longer with less pain. Avoid exercises that include squatting or kneeling. They can put a lot of strain on your knees. Talk to your doctor to make sure that the exercise you do is not making the arthritis worse.   Do not sit for long periods of time. Try to walk once in a while to keep your knee from getting stiff. Ask your doctor or physical therapist whether shoe inserts may reduce your arthritis pain. If you can afford it, get new athletic shoes at least every year. This can help reduce the strain on your knees. Use a device to help you do everyday activities. A cane or walking stick can help you keep your balance when you walk. Hold the cane or walking stick in the hand opposite the painful knee. If you feel like you may fall when you walk, try using crutches or a front-wheeled walker. These can prevent falls that could cause more damage to your knee. A knee brace may help keep your knee stable and prevent pain. You also can use other things to make life easier, such as a higher toilet seat and handrails in the bathtub or shower. Take pain medicines exactly as directed. Do not wait until you are in severe pain. You will get better results if you take it sooner. If you are not taking a prescription pain medicine, take an over-the-counter medicine such as acetaminophen (Tylenol), ibuprofen (Advil, Motrin), or naproxen (Aleve). Read and follow all instructions on the label. Do not take two or more pain medicines at the same time unless the doctor told you to. Many pain medicines have acetaminophen, which is Tylenol. Too much acetaminophen (Tylenol) can be harmful. Tell your doctor if you take a blood thinner, have diabetes, or have allergies to shellfish. Ask your doctor if you might benefit from a shot of steroid medicine into your knee. This may provide pain relief for several months. Many people take the supplements glucosamine and chondroitin for osteoarthritis. Some people feel they help, but the medical research does not show that they work. Talk to your doctor before you take these supplements. When should you call for help?    Call your doctor now or seek immediate medical care if:    You have sudden swelling, warmth, or pain in your knee. You have knee pain and a fever or rash. You have such bad pain that you cannot use your knee. Watch closely for changes in your health, and be sure to contact your doctor if you have any problems. Where can you learn more? Go to http://www.gray.com/  Enter W187 in the search box to learn more about \"Knee Arthritis: Care Instructions. \"  Current as of: December 20, 2021               Content Version: 13.2  © 2006-2022 [facility]. Care instructions adapted under license by [employer] (which disclaims liability or warranty for this information). If you have questions about a medical condition or this instruction, always ask your healthcare professional. [address] any warranty or liability for your use of this information. (1) weak, irregular

## 2024-08-08 ENCOUNTER — HOSPITAL ENCOUNTER (OUTPATIENT)
Facility: HOSPITAL | Age: 49
Discharge: HOME | End: 2024-08-08
Payer: MEDICARE

## 2024-08-08 LAB
ALBUMIN SERPL-MCNC: 3.9 G/DL (ref 3.4–5)
ALBUMIN/GLOB SERPL: 1.4 (ref 0.8–1.7)
ALP SERPL-CCNC: 69 U/L (ref 45–117)
ALT SERPL-CCNC: 27 U/L (ref 13–56)
ANION GAP SERPL CALC-SCNC: 7 MMOL/L (ref 3–18)
APPEARANCE UR: CLEAR
APTT PPP: 26.3 SEC (ref 23–36.4)
AST SERPL-CCNC: 26 U/L (ref 10–38)
BASOPHILS # BLD: 0 K/UL (ref 0–0.1)
BASOPHILS NFR BLD: 1 % (ref 0–2)
BILIRUB SERPL-MCNC: 0.4 MG/DL (ref 0.2–1)
BILIRUB UR QL: NEGATIVE
BUN SERPL-MCNC: 5 MG/DL (ref 7–18)
BUN/CREAT SERPL: 9 (ref 12–20)
CALCIUM SERPL-MCNC: 8.9 MG/DL (ref 8.5–10.1)
CHLORIDE SERPL-SCNC: 105 MMOL/L (ref 100–111)
CO2 SERPL-SCNC: 27 MMOL/L (ref 21–32)
COLOR UR: YELLOW
CREAT SERPL-MCNC: 0.57 MG/DL (ref 0.6–1.3)
CRP SERPL-MCNC: 0.5 MG/DL (ref 0–0.3)
DIFFERENTIAL METHOD BLD: ABNORMAL
EKG ATRIAL RATE: 73 BPM
EKG DIAGNOSIS: NORMAL
EKG P AXIS: 59 DEGREES
EKG P-R INTERVAL: 156 MS
EKG Q-T INTERVAL: 394 MS
EKG QRS DURATION: 110 MS
EKG QTC CALCULATION (BAZETT): 434 MS
EKG R AXIS: 4 DEGREES
EKG T AXIS: 4 DEGREES
EKG VENTRICULAR RATE: 73 BPM
EOSINOPHIL # BLD: 0.1 K/UL (ref 0–0.4)
EOSINOPHIL NFR BLD: 2 % (ref 0–5)
ERYTHROCYTE [DISTWIDTH] IN BLOOD BY AUTOMATED COUNT: 13.4 % (ref 11.6–14.5)
ERYTHROCYTE [SEDIMENTATION RATE] IN BLOOD: 10 MM/HR (ref 0–30)
EST. AVERAGE GLUCOSE BLD GHB EST-MCNC: 88 MG/DL
GLOBULIN SER CALC-MCNC: 2.8 G/DL (ref 2–4)
GLUCOSE SERPL-MCNC: 90 MG/DL (ref 74–99)
GLUCOSE UR STRIP.AUTO-MCNC: NEGATIVE MG/DL
HBA1C MFR BLD: 4.7 % (ref 4.2–5.6)
HCT VFR BLD AUTO: 38.2 % (ref 35–45)
HGB BLD-MCNC: 12.7 G/DL (ref 12–16)
HGB UR QL STRIP: NEGATIVE
IMM GRANULOCYTES # BLD AUTO: 0 K/UL (ref 0–0.04)
IMM GRANULOCYTES NFR BLD AUTO: 1 % (ref 0–0.5)
INR PPP: 1 (ref 0.9–1.1)
KETONES UR QL STRIP.AUTO: NEGATIVE MG/DL
LEUKOCYTE ESTERASE UR QL STRIP.AUTO: NEGATIVE
LYMPHOCYTES # BLD: 1 K/UL (ref 0.9–3.6)
LYMPHOCYTES NFR BLD: 26 % (ref 21–52)
MCH RBC QN AUTO: 30.5 PG (ref 24–34)
MCHC RBC AUTO-ENTMCNC: 33.2 G/DL (ref 31–37)
MCV RBC AUTO: 91.8 FL (ref 78–100)
MONOCYTES # BLD: 0.4 K/UL (ref 0.05–1.2)
MONOCYTES NFR BLD: 9 % (ref 3–10)
NEUTS SEG # BLD: 2.4 K/UL (ref 1.8–8)
NEUTS SEG NFR BLD: 61 % (ref 40–73)
NITRITE UR QL STRIP.AUTO: NEGATIVE
NRBC # BLD: 0 K/UL (ref 0–0.01)
NRBC BLD-RTO: 0 PER 100 WBC
PH UR STRIP: 7.5 (ref 5–8)
PLATELET # BLD AUTO: 298 K/UL (ref 135–420)
PMV BLD AUTO: 10.2 FL (ref 9.2–11.8)
POTASSIUM SERPL-SCNC: 3.9 MMOL/L (ref 3.5–5.5)
PROT SERPL-MCNC: 6.7 G/DL (ref 6.4–8.2)
PROT UR STRIP-MCNC: NEGATIVE MG/DL
PROTHROMBIN TIME: 13.2 SEC (ref 11.9–14.9)
RBC # BLD AUTO: 4.16 M/UL (ref 4.2–5.3)
SODIUM SERPL-SCNC: 139 MMOL/L (ref 136–145)
SP GR UR REFRACTOMETRY: 1.01 (ref 1–1.03)
UROBILINOGEN UR QL STRIP.AUTO: 0.2 EU/DL (ref 0.2–1)
WBC # BLD AUTO: 3.9 K/UL (ref 4.6–13.2)

## 2024-08-08 PROCEDURE — 80053 COMPREHEN METABOLIC PANEL: CPT

## 2024-08-08 PROCEDURE — 85730 THROMBOPLASTIN TIME PARTIAL: CPT

## 2024-08-08 PROCEDURE — 36415 COLL VENOUS BLD VENIPUNCTURE: CPT

## 2024-08-08 PROCEDURE — 85610 PROTHROMBIN TIME: CPT

## 2024-08-08 PROCEDURE — 83036 HEMOGLOBIN GLYCOSYLATED A1C: CPT

## 2024-08-08 PROCEDURE — 93010 ELECTROCARDIOGRAM REPORT: CPT | Performed by: INTERNAL MEDICINE

## 2024-08-08 PROCEDURE — 87086 URINE CULTURE/COLONY COUNT: CPT

## 2024-08-08 PROCEDURE — 86140 C-REACTIVE PROTEIN: CPT

## 2024-08-08 PROCEDURE — 93005 ELECTROCARDIOGRAM TRACING: CPT | Performed by: ORTHOPAEDIC SURGERY

## 2024-08-08 PROCEDURE — 81003 URINALYSIS AUTO W/O SCOPE: CPT

## 2024-08-08 PROCEDURE — 85025 COMPLETE CBC W/AUTO DIFF WBC: CPT

## 2024-08-08 PROCEDURE — 85652 RBC SED RATE AUTOMATED: CPT

## 2024-08-09 LAB
BACTERIA SPEC CULT: NORMAL
SERVICE CMNT-IMP: NORMAL

## 2024-08-10 LAB
BACTERIA SPEC CULT: NORMAL
BACTERIA SPEC CULT: NORMAL
SERVICE CMNT-IMP: NORMAL

## 2024-08-22 ENCOUNTER — HOSPITAL ENCOUNTER (OUTPATIENT)
Facility: HOSPITAL | Age: 49
Discharge: HOME | End: 2024-08-22
Attending: ORTHOPAEDIC SURGERY
Payer: MEDICARE

## 2024-08-22 DIAGNOSIS — M17.11 ARTHRITIS OF KNEE, RIGHT: ICD-10-CM

## 2024-08-22 PROCEDURE — 73700 CT LOWER EXTREMITY W/O DYE: CPT

## 2024-08-30 ENCOUNTER — ANESTHESIA EVENT (OUTPATIENT)
Facility: HOSPITAL | Age: 49
End: 2024-08-30
Payer: MEDICARE

## 2024-09-04 ENCOUNTER — HOSPITAL ENCOUNTER (OUTPATIENT)
Facility: HOSPITAL | Age: 49
Setting detail: OUTPATIENT SURGERY
Discharge: HOME HEALTH | End: 2024-09-04
Attending: ORTHOPAEDIC SURGERY | Admitting: ORTHOPAEDIC SURGERY
Payer: MEDICARE

## 2024-09-04 ENCOUNTER — APPOINTMENT (OUTPATIENT)
Facility: HOSPITAL | Age: 49
End: 2024-09-04
Attending: ORTHOPAEDIC SURGERY
Payer: MEDICARE

## 2024-09-04 ENCOUNTER — ANESTHESIA (OUTPATIENT)
Facility: HOSPITAL | Age: 49
End: 2024-09-04
Payer: MEDICARE

## 2024-09-04 VITALS
OXYGEN SATURATION: 99 % | BODY MASS INDEX: 30.91 KG/M2 | HEIGHT: 66 IN | SYSTOLIC BLOOD PRESSURE: 150 MMHG | WEIGHT: 192.3 LBS | HEART RATE: 76 BPM | TEMPERATURE: 97.3 F | DIASTOLIC BLOOD PRESSURE: 73 MMHG | RESPIRATION RATE: 18 BRPM

## 2024-09-04 DIAGNOSIS — Z96.651 S/P REVISION OF TOTAL KNEE, RIGHT: Primary | ICD-10-CM

## 2024-09-04 LAB
ABO + RH BLD: NORMAL
BLOOD GROUP ANTIBODIES SERPL: NORMAL
SPECIMEN EXP DATE BLD: NORMAL

## 2024-09-04 PROCEDURE — C1713 ANCHOR/SCREW BN/BN,TIS/BN: HCPCS | Performed by: ORTHOPAEDIC SURGERY

## 2024-09-04 PROCEDURE — 73560 X-RAY EXAM OF KNEE 1 OR 2: CPT

## 2024-09-04 PROCEDURE — C1776 JOINT DEVICE (IMPLANTABLE): HCPCS | Performed by: ORTHOPAEDIC SURGERY

## 2024-09-04 PROCEDURE — 6360000002 HC RX W HCPCS: Performed by: STUDENT IN AN ORGANIZED HEALTH CARE EDUCATION/TRAINING PROGRAM

## 2024-09-04 PROCEDURE — 6360000002 HC RX W HCPCS: Performed by: ORTHOPAEDIC SURGERY

## 2024-09-04 PROCEDURE — 3700000001 HC ADD 15 MINUTES (ANESTHESIA): Performed by: ORTHOPAEDIC SURGERY

## 2024-09-04 PROCEDURE — 3600000013 HC SURGERY LEVEL 3 ADDTL 15MIN: Performed by: ORTHOPAEDIC SURGERY

## 2024-09-04 PROCEDURE — 2580000003 HC RX 258: Performed by: ORTHOPAEDIC SURGERY

## 2024-09-04 PROCEDURE — 86901 BLOOD TYPING SEROLOGIC RH(D): CPT

## 2024-09-04 PROCEDURE — 2500000003 HC RX 250 WO HCPCS: Performed by: ORTHOPAEDIC SURGERY

## 2024-09-04 PROCEDURE — 2709999900 HC NON-CHARGEABLE SUPPLY: Performed by: ORTHOPAEDIC SURGERY

## 2024-09-04 PROCEDURE — 7100000001 HC PACU RECOVERY - ADDTL 15 MIN: Performed by: ORTHOPAEDIC SURGERY

## 2024-09-04 PROCEDURE — 6360000002 HC RX W HCPCS

## 2024-09-04 PROCEDURE — 86850 RBC ANTIBODY SCREEN: CPT

## 2024-09-04 PROCEDURE — 6370000000 HC RX 637 (ALT 250 FOR IP): Performed by: STUDENT IN AN ORGANIZED HEALTH CARE EDUCATION/TRAINING PROGRAM

## 2024-09-04 PROCEDURE — 64447 NJX AA&/STRD FEMORAL NRV IMG: CPT | Performed by: STUDENT IN AN ORGANIZED HEALTH CARE EDUCATION/TRAINING PROGRAM

## 2024-09-04 PROCEDURE — 36415 COLL VENOUS BLD VENIPUNCTURE: CPT

## 2024-09-04 PROCEDURE — 7100000000 HC PACU RECOVERY - FIRST 15 MIN: Performed by: ORTHOPAEDIC SURGERY

## 2024-09-04 PROCEDURE — 86900 BLOOD TYPING SEROLOGIC ABO: CPT

## 2024-09-04 PROCEDURE — 3600000003 HC SURGERY LEVEL 3 BASE: Performed by: ORTHOPAEDIC SURGERY

## 2024-09-04 PROCEDURE — 7100000010 HC PHASE II RECOVERY - FIRST 15 MIN: Performed by: ORTHOPAEDIC SURGERY

## 2024-09-04 PROCEDURE — C9290 INJ, BUPIVACAINE LIPOSOME: HCPCS | Performed by: ORTHOPAEDIC SURGERY

## 2024-09-04 PROCEDURE — 2500000003 HC RX 250 WO HCPCS

## 2024-09-04 PROCEDURE — 3700000000 HC ANESTHESIA ATTENDED CARE: Performed by: ORTHOPAEDIC SURGERY

## 2024-09-04 PROCEDURE — 97165 OT EVAL LOW COMPLEX 30 MIN: CPT

## 2024-09-04 PROCEDURE — 7100000011 HC PHASE II RECOVERY - ADDTL 15 MIN: Performed by: ORTHOPAEDIC SURGERY

## 2024-09-04 PROCEDURE — 97161 PT EVAL LOW COMPLEX 20 MIN: CPT

## 2024-09-04 PROCEDURE — 2720000010 HC SURG SUPPLY STERILE: Performed by: ORTHOPAEDIC SURGERY

## 2024-09-04 DEVICE — TIBIAL BEARING INSERT - CR
Type: IMPLANTABLE DEVICE | Site: KNEE | Status: FUNCTIONAL
Brand: TRIATHLON

## 2024-09-04 DEVICE — PATELLA
Type: IMPLANTABLE DEVICE | Site: KNEE | Status: FUNCTIONAL
Brand: TRIATHLON

## 2024-09-04 DEVICE — CRUCIATE RETAINING FEMORAL
Type: IMPLANTABLE DEVICE | Site: KNEE | Status: FUNCTIONAL
Brand: TRIATHLON

## 2024-09-04 DEVICE — TIBIAL COMPONENT
Type: IMPLANTABLE DEVICE | Site: KNEE | Status: FUNCTIONAL
Brand: TRIATHLON

## 2024-09-04 RX ORDER — SODIUM CHLORIDE 9 MG/ML
INJECTION, SOLUTION INTRAVENOUS PRN
Status: CANCELLED | OUTPATIENT
Start: 2024-09-04

## 2024-09-04 RX ORDER — DEXMEDETOMIDINE HYDROCHLORIDE 100 UG/ML
INJECTION, SOLUTION INTRAVENOUS PRN
Status: DISCONTINUED | OUTPATIENT
Start: 2024-09-04 | End: 2024-09-04 | Stop reason: SDUPTHER

## 2024-09-04 RX ORDER — OXYCODONE AND ACETAMINOPHEN 5; 325 MG/1; MG/1
1 TABLET ORAL EVERY 6 HOURS PRN
Qty: 28 TABLET | Refills: 0 | Status: SHIPPED | OUTPATIENT
Start: 2024-09-04 | End: 2024-09-11

## 2024-09-04 RX ORDER — ONDANSETRON 4 MG/1
4 TABLET, ORALLY DISINTEGRATING ORAL EVERY 8 HOURS PRN
Status: DISCONTINUED | OUTPATIENT
Start: 2024-09-04 | End: 2024-09-04 | Stop reason: HOSPADM

## 2024-09-04 RX ORDER — ROPIVACAINE HYDROCHLORIDE 5 MG/ML
INJECTION, SOLUTION EPIDURAL; INFILTRATION; PERINEURAL
Status: COMPLETED | OUTPATIENT
Start: 2024-09-04 | End: 2024-09-04

## 2024-09-04 RX ORDER — SODIUM CHLORIDE 9 MG/ML
INJECTION, SOLUTION INTRAVENOUS PRN
Status: DISCONTINUED | OUTPATIENT
Start: 2024-09-04 | End: 2024-09-04 | Stop reason: HOSPADM

## 2024-09-04 RX ORDER — SODIUM CHLORIDE, SODIUM LACTATE, POTASSIUM CHLORIDE, CALCIUM CHLORIDE 600; 310; 30; 20 MG/100ML; MG/100ML; MG/100ML; MG/100ML
INJECTION, SOLUTION INTRAVENOUS CONTINUOUS
Status: CANCELLED | OUTPATIENT
Start: 2024-09-04

## 2024-09-04 RX ORDER — SODIUM CHLORIDE 0.9 % (FLUSH) 0.9 %
5-40 SYRINGE (ML) INJECTION PRN
Status: DISCONTINUED | OUTPATIENT
Start: 2024-09-04 | End: 2024-09-04 | Stop reason: HOSPADM

## 2024-09-04 RX ORDER — HYDROMORPHONE HYDROCHLORIDE 1 MG/ML
0.5 INJECTION, SOLUTION INTRAMUSCULAR; INTRAVENOUS; SUBCUTANEOUS
Status: COMPLETED | OUTPATIENT
Start: 2024-09-04 | End: 2024-09-04

## 2024-09-04 RX ORDER — OXYCODONE HYDROCHLORIDE 5 MG/1
5 TABLET ORAL EVERY 4 HOURS PRN
Status: DISCONTINUED | OUTPATIENT
Start: 2024-09-04 | End: 2024-09-04 | Stop reason: HOSPADM

## 2024-09-04 RX ORDER — LIDOCAINE HYDROCHLORIDE 20 MG/ML
INJECTION, SOLUTION EPIDURAL; INFILTRATION; INTRACAUDAL; PERINEURAL PRN
Status: DISCONTINUED | OUTPATIENT
Start: 2024-09-04 | End: 2024-09-04 | Stop reason: SDUPTHER

## 2024-09-04 RX ORDER — TRANEXAMIC ACID 10 MG/ML
INJECTION, SOLUTION INTRAVENOUS PRN
Status: DISCONTINUED | OUTPATIENT
Start: 2024-09-04 | End: 2024-09-04 | Stop reason: SDUPTHER

## 2024-09-04 RX ORDER — ONDANSETRON 2 MG/ML
4 INJECTION INTRAMUSCULAR; INTRAVENOUS EVERY 6 HOURS PRN
Status: DISCONTINUED | OUTPATIENT
Start: 2024-09-04 | End: 2024-09-04 | Stop reason: HOSPADM

## 2024-09-04 RX ORDER — ASPIRIN 81 MG/1
81 TABLET ORAL 2 TIMES DAILY
Qty: 60 TABLET | Refills: 0 | Status: SHIPPED | OUTPATIENT
Start: 2024-09-04 | End: 2024-10-04

## 2024-09-04 RX ORDER — ONDANSETRON 2 MG/ML
INJECTION INTRAMUSCULAR; INTRAVENOUS PRN
Status: DISCONTINUED | OUTPATIENT
Start: 2024-09-04 | End: 2024-09-04 | Stop reason: SDUPTHER

## 2024-09-04 RX ORDER — MORPHINE SULFATE 4 MG/ML
INJECTION, SOLUTION INTRAMUSCULAR; INTRAVENOUS PRN
Status: DISCONTINUED | OUTPATIENT
Start: 2024-09-04 | End: 2024-09-04 | Stop reason: ALTCHOICE

## 2024-09-04 RX ORDER — OXYCODONE HYDROCHLORIDE 5 MG/1
5 TABLET ORAL
Status: DISCONTINUED | OUTPATIENT
Start: 2024-09-04 | End: 2024-09-04 | Stop reason: HOSPADM

## 2024-09-04 RX ORDER — SODIUM CHLORIDE, SODIUM LACTATE, POTASSIUM CHLORIDE, CALCIUM CHLORIDE 600; 310; 30; 20 MG/100ML; MG/100ML; MG/100ML; MG/100ML
INJECTION, SOLUTION INTRAVENOUS CONTINUOUS
Status: DISCONTINUED | OUTPATIENT
Start: 2024-09-04 | End: 2024-09-04 | Stop reason: HOSPADM

## 2024-09-04 RX ORDER — SODIUM CHLORIDE 0.9 % (FLUSH) 0.9 %
5-40 SYRINGE (ML) INJECTION PRN
Status: CANCELLED | OUTPATIENT
Start: 2024-09-04

## 2024-09-04 RX ORDER — SODIUM CHLORIDE, SODIUM LACTATE, POTASSIUM CHLORIDE, AND CALCIUM CHLORIDE .6; .31; .03; .02 G/100ML; G/100ML; G/100ML; G/100ML
IRRIGANT IRRIGATION PRN
Status: DISCONTINUED | OUTPATIENT
Start: 2024-09-04 | End: 2024-09-04 | Stop reason: ALTCHOICE

## 2024-09-04 RX ORDER — MELOXICAM 7.5 MG/1
15 TABLET ORAL DAILY
Status: CANCELLED | OUTPATIENT
Start: 2024-09-04 | End: 2024-09-07

## 2024-09-04 RX ORDER — ACETAMINOPHEN 325 MG/1
650 TABLET ORAL EVERY 6 HOURS
Status: CANCELLED | OUTPATIENT
Start: 2024-09-04

## 2024-09-04 RX ORDER — PROCHLORPERAZINE EDISYLATE 5 MG/ML
5 INJECTION INTRAMUSCULAR; INTRAVENOUS
Status: DISCONTINUED | OUTPATIENT
Start: 2024-09-04 | End: 2024-09-04 | Stop reason: HOSPADM

## 2024-09-04 RX ORDER — DEXAMETHASONE SODIUM PHOSPHATE 4 MG/ML
INJECTION, SOLUTION INTRA-ARTICULAR; INTRALESIONAL; INTRAMUSCULAR; INTRAVENOUS; SOFT TISSUE PRN
Status: DISCONTINUED | OUTPATIENT
Start: 2024-09-04 | End: 2024-09-04 | Stop reason: SDUPTHER

## 2024-09-04 RX ORDER — SODIUM CHLORIDE 0.9 % (FLUSH) 0.9 %
5-40 SYRINGE (ML) INJECTION EVERY 12 HOURS SCHEDULED
Status: DISCONTINUED | OUTPATIENT
Start: 2024-09-04 | End: 2024-09-04 | Stop reason: HOSPADM

## 2024-09-04 RX ORDER — HYDROMORPHONE HYDROCHLORIDE 1 MG/ML
0.25 INJECTION, SOLUTION INTRAMUSCULAR; INTRAVENOUS; SUBCUTANEOUS EVERY 5 MIN PRN
Status: COMPLETED | OUTPATIENT
Start: 2024-09-04 | End: 2024-09-04

## 2024-09-04 RX ORDER — FENTANYL CITRATE 50 UG/ML
INJECTION, SOLUTION INTRAMUSCULAR; INTRAVENOUS
Status: DISCONTINUED
Start: 2024-09-04 | End: 2024-09-04 | Stop reason: HOSPADM

## 2024-09-04 RX ORDER — ACETAMINOPHEN 500 MG
1300 TABLET ORAL EVERY 6 HOURS PRN
COMMUNITY

## 2024-09-04 RX ORDER — CHLORHEXIDINE GLUCONATE 40 MG/ML
SOLUTION TOPICAL DAILY PRN
Status: DISCONTINUED | OUTPATIENT
Start: 2024-09-04 | End: 2024-09-04 | Stop reason: HOSPADM

## 2024-09-04 RX ORDER — NALOXONE HYDROCHLORIDE 0.4 MG/ML
INJECTION, SOLUTION INTRAMUSCULAR; INTRAVENOUS; SUBCUTANEOUS PRN
Status: DISCONTINUED | OUTPATIENT
Start: 2024-09-04 | End: 2024-09-04 | Stop reason: HOSPADM

## 2024-09-04 RX ORDER — TRANEXAMIC ACID 10 MG/ML
1000 INJECTION, SOLUTION INTRAVENOUS
Status: DISCONTINUED | OUTPATIENT
Start: 2024-09-04 | End: 2024-09-04 | Stop reason: HOSPADM

## 2024-09-04 RX ORDER — FENTANYL CITRATE 50 UG/ML
INJECTION, SOLUTION INTRAMUSCULAR; INTRAVENOUS PRN
Status: DISCONTINUED | OUTPATIENT
Start: 2024-09-04 | End: 2024-09-04 | Stop reason: SDUPTHER

## 2024-09-04 RX ORDER — PROPOFOL 10 MG/ML
INJECTION, EMULSION INTRAVENOUS PRN
Status: DISCONTINUED | OUTPATIENT
Start: 2024-09-04 | End: 2024-09-04 | Stop reason: SDUPTHER

## 2024-09-04 RX ORDER — SODIUM CHLORIDE 0.9 % (FLUSH) 0.9 %
5-40 SYRINGE (ML) INJECTION EVERY 12 HOURS SCHEDULED
Status: CANCELLED | OUTPATIENT
Start: 2024-09-04

## 2024-09-04 RX ORDER — EPHEDRINE SULFATE/0.9% NACL/PF 25 MG/5 ML
SYRINGE (ML) INTRAVENOUS PRN
Status: DISCONTINUED | OUTPATIENT
Start: 2024-09-04 | End: 2024-09-04 | Stop reason: SDUPTHER

## 2024-09-04 RX ORDER — MIDAZOLAM HYDROCHLORIDE 1 MG/ML
INJECTION INTRAMUSCULAR; INTRAVENOUS PRN
Status: DISCONTINUED | OUTPATIENT
Start: 2024-09-04 | End: 2024-09-04 | Stop reason: SDUPTHER

## 2024-09-04 RX ORDER — FENTANYL CITRATE 50 UG/ML
50 INJECTION, SOLUTION INTRAMUSCULAR; INTRAVENOUS EVERY 5 MIN PRN
Status: COMPLETED | OUTPATIENT
Start: 2024-09-04 | End: 2024-09-04

## 2024-09-04 RX ORDER — ONDANSETRON 2 MG/ML
4 INJECTION INTRAMUSCULAR; INTRAVENOUS
Status: DISCONTINUED | OUTPATIENT
Start: 2024-09-04 | End: 2024-09-04 | Stop reason: HOSPADM

## 2024-09-04 RX ORDER — MIDAZOLAM HYDROCHLORIDE 2 MG/2ML
INJECTION, SOLUTION INTRAMUSCULAR; INTRAVENOUS
Status: COMPLETED
Start: 2024-09-04 | End: 2024-09-04

## 2024-09-04 RX ADMIN — HYDROMORPHONE HYDROCHLORIDE 1 MG: 1 INJECTION, SOLUTION INTRAMUSCULAR; INTRAVENOUS; SUBCUTANEOUS at 07:44

## 2024-09-04 RX ADMIN — TRANEXAMIC ACID 1 G: 10 INJECTION, SOLUTION INTRAVENOUS at 08:53

## 2024-09-04 RX ADMIN — FENTANYL CITRATE 50 MCG: 50 INJECTION INTRAMUSCULAR; INTRAVENOUS at 09:46

## 2024-09-04 RX ADMIN — WATER 2000 MG: 1 INJECTION INTRAMUSCULAR; INTRAVENOUS; SUBCUTANEOUS at 11:51

## 2024-09-04 RX ADMIN — HYDROMORPHONE HYDROCHLORIDE 0.25 MG: 1 INJECTION, SOLUTION INTRAMUSCULAR; INTRAVENOUS; SUBCUTANEOUS at 10:08

## 2024-09-04 RX ADMIN — HYDROMORPHONE HYDROCHLORIDE 0.5 MG: 1 INJECTION, SOLUTION INTRAMUSCULAR; INTRAVENOUS; SUBCUTANEOUS at 10:27

## 2024-09-04 RX ADMIN — DEXMEDETOMIDINE 6 MCG: 100 INJECTION, SOLUTION, CONCENTRATE INTRAVENOUS at 08:29

## 2024-09-04 RX ADMIN — WATER 2000 MG: 1 INJECTION INTRAMUSCULAR; INTRAVENOUS; SUBCUTANEOUS at 07:39

## 2024-09-04 RX ADMIN — SODIUM CHLORIDE, SODIUM LACTATE, POTASSIUM CHLORIDE, AND CALCIUM CHLORIDE: 600; 310; 30; 20 INJECTION, SOLUTION INTRAVENOUS at 06:17

## 2024-09-04 RX ADMIN — FENTANYL CITRATE 25 MCG: 50 INJECTION, SOLUTION INTRAMUSCULAR; INTRAVENOUS at 07:43

## 2024-09-04 RX ADMIN — FENTANYL CITRATE 50 MCG: 50 INJECTION, SOLUTION INTRAMUSCULAR; INTRAVENOUS at 07:31

## 2024-09-04 RX ADMIN — OXYCODONE HYDROCHLORIDE 5 MG: 5 TABLET ORAL at 11:15

## 2024-09-04 RX ADMIN — EPHEDRINE SULFATE 5 MG: 5 INJECTION INTRAVENOUS at 09:06

## 2024-09-04 RX ADMIN — TRANEXAMIC ACID 1 G: 10 INJECTION, SOLUTION INTRAVENOUS at 07:44

## 2024-09-04 RX ADMIN — HYDROMORPHONE HYDROCHLORIDE 0.25 MG: 1 INJECTION, SOLUTION INTRAMUSCULAR; INTRAVENOUS; SUBCUTANEOUS at 10:15

## 2024-09-04 RX ADMIN — EPHEDRINE SULFATE 5 MG: 5 INJECTION INTRAVENOUS at 07:50

## 2024-09-04 RX ADMIN — SODIUM CHLORIDE, SODIUM LACTATE, POTASSIUM CHLORIDE, AND CALCIUM CHLORIDE: 600; 310; 30; 20 INJECTION, SOLUTION INTRAVENOUS at 09:44

## 2024-09-04 RX ADMIN — FENTANYL CITRATE 50 MCG: 50 INJECTION INTRAMUSCULAR; INTRAVENOUS at 09:55

## 2024-09-04 RX ADMIN — FENTANYL CITRATE 25 MCG: 50 INJECTION, SOLUTION INTRAMUSCULAR; INTRAVENOUS at 08:18

## 2024-09-04 RX ADMIN — ONDANSETRON 4 MG: 2 INJECTION INTRAMUSCULAR; INTRAVENOUS at 07:41

## 2024-09-04 RX ADMIN — PROPOFOL 200 MG: 10 INJECTION, EMULSION INTRAVENOUS at 07:34

## 2024-09-04 RX ADMIN — MIDAZOLAM 2 MG: 1 INJECTION INTRAMUSCULAR; INTRAVENOUS at 07:10

## 2024-09-04 RX ADMIN — DEXAMETHASONE SODIUM PHOSPHATE 4 MG: 4 INJECTION INTRA-ARTICULAR; INTRALESIONAL; INTRAMUSCULAR; INTRAVENOUS; SOFT TISSUE at 07:41

## 2024-09-04 RX ADMIN — LIDOCAINE HYDROCHLORIDE 100 MG: 20 INJECTION, SOLUTION EPIDURAL; INFILTRATION; INTRACAUDAL; PERINEURAL at 07:34

## 2024-09-04 RX ADMIN — SODIUM CHLORIDE, SODIUM LACTATE, POTASSIUM CHLORIDE, AND CALCIUM CHLORIDE: 600; 310; 30; 20 INJECTION, SOLUTION INTRAVENOUS at 08:42

## 2024-09-04 RX ADMIN — PROPOFOL 50 MG: 10 INJECTION, EMULSION INTRAVENOUS at 09:30

## 2024-09-04 RX ADMIN — ROPIVACAINE HYDROCHLORIDE 10 ML: 5 INJECTION, SOLUTION EPIDURAL; INFILTRATION; PERINEURAL at 07:15

## 2024-09-04 ASSESSMENT — PAIN - FUNCTIONAL ASSESSMENT
PAIN_FUNCTIONAL_ASSESSMENT: 0-10
PAIN_FUNCTIONAL_ASSESSMENT: PREVENTS OR INTERFERES SOME ACTIVE ACTIVITIES AND ADLS
PAIN_FUNCTIONAL_ASSESSMENT: PREVENTS OR INTERFERES WITH MANY ACTIVE NOT PASSIVE ACTIVITIES
PAIN_FUNCTIONAL_ASSESSMENT: PREVENTS OR INTERFERES SOME ACTIVE ACTIVITIES AND ADLS
PAIN_FUNCTIONAL_ASSESSMENT: PREVENTS OR INTERFERES WITH MANY ACTIVE NOT PASSIVE ACTIVITIES
PAIN_FUNCTIONAL_ASSESSMENT: ACTIVITIES ARE NOT PREVENTED
PAIN_FUNCTIONAL_ASSESSMENT: PREVENTS OR INTERFERES SOME ACTIVE ACTIVITIES AND ADLS
PAIN_FUNCTIONAL_ASSESSMENT: ACTIVITIES ARE NOT PREVENTED
PAIN_FUNCTIONAL_ASSESSMENT: ACTIVITIES ARE NOT PREVENTED

## 2024-09-04 ASSESSMENT — PAIN DESCRIPTION - FREQUENCY
FREQUENCY: CONTINUOUS

## 2024-09-04 ASSESSMENT — PAIN SCALES - GENERAL
PAINLEVEL_OUTOF10: 5
PAINLEVEL_OUTOF10: 4
PAINLEVEL_OUTOF10: 5
PAINLEVEL_OUTOF10: 6
PAINLEVEL_OUTOF10: 9
PAINLEVEL_OUTOF10: 6
PAINLEVEL_OUTOF10: 6
PAINLEVEL_OUTOF10: 0
PAINLEVEL_OUTOF10: 7
PAINLEVEL_OUTOF10: 9
PAINLEVEL_OUTOF10: 7

## 2024-09-04 ASSESSMENT — PAIN DESCRIPTION - LOCATION
LOCATION: KNEE

## 2024-09-04 ASSESSMENT — PAIN DESCRIPTION - DESCRIPTORS
DESCRIPTORS: ACHING;BURNING
DESCRIPTORS: ACHING;BURNING
DESCRIPTORS: ACHING;BURNING;THROBBING
DESCRIPTORS: ACHING
DESCRIPTORS: ACHING;BURNING
DESCRIPTORS: ACHING
DESCRIPTORS: ACHING;BURNING
DESCRIPTORS: ACHING;BURNING;THROBBING
DESCRIPTORS: BURNING;THROBBING

## 2024-09-04 ASSESSMENT — PAIN DESCRIPTION - PAIN TYPE
TYPE: SURGICAL PAIN

## 2024-09-04 ASSESSMENT — PAIN DESCRIPTION - ONSET
ONSET: ON-GOING

## 2024-09-04 ASSESSMENT — PAIN DESCRIPTION - ORIENTATION
ORIENTATION: RIGHT

## 2024-09-04 ASSESSMENT — LIFESTYLE VARIABLES: SMOKING_STATUS: 0

## 2024-09-04 NOTE — H&P
[facility]  History and Physical Exam    Patient: Zachariah Nieves MRN: [id number]  SSN: [id number]    YOB: 1975  Age: 49 y.o.  Sex: female      Subjective:      Chief Complaint: right knee pain    History of Present Illness:  Patient complains of knee pain on the affected side and difficulty ambulating. Pain is increased with increasing activity. Pain is increased with weight bearing. Pain interferes with activities of daily living. Patient has noticed decreased range of motion    Past Medical History:   Diagnosis Date    Arthritis     OA    Endometrial hyperplasia     GERD (gastroesophageal reflux disease)     Insomnia 2016    Lumbago 2009    Lumbar spondylosis 2018    Migraines     pt states resolved    Plantar fasciitis 2012    Vitamin D deficiency 2015    Wears glasses      Past Surgical History:   Procedure Laterality Date    BACK SURGERY  2014    COLONOSCOPY  2007    ENDOSCOPY, COLON, DIAGNOSTIC  2016    2006    GASTRECTOMY  2015    GASTRIC BYPASS SURGERY  2015    HAND SURGERY Right 2024    HYSTERECTOMY (CERVIX STATUS UNKNOWN)  2010    KNEE SURGERY Right 2020    L 2019    LAMINECTOMY  2013    LUMBAR FUSION      SHOULDER ARTHROPLASTY Left 2009    TONSILLECTOMY  2014    TUBAL LIGATION  1996    VAGINA SURGERY  2010    vaginal cuff repair     Social History     Occupational History    Not on file   Tobacco Use    Smoking status: Former     Current packs/day: 0.00     Types: Cigarettes     Quit date: 2012     Years since quittin.6    Smokeless tobacco: Never   Vaping Use    Vaping status: Never Used   Substance and Sexual Activity    Alcohol use: Yes     Comment: special occasions    Drug use: No    Sexual activity: Not on file     Prior to Admission medications    Medication Sig Start Date End Date Taking? Authorizing Provider   acetaminophen (TYLENOL) 500 MG tablet Take 1,300 mg by mouth every 6 hours as needed for Pain   Yes Provider, MD Mitali    oxyCODONE HCl (OXY-IR) 10 MG immediate release tablet Take 1 tablet by mouth every 6 hours as needed for Pain.   Yes Automatic Reconciliation, Ar   estrogens, conjugated, (PREMARIN) 0.9 MG tablet Take 1 tablet by mouth as needed  Patient not taking: Reported on 9/4/2024    Provider, MD Mitali   Turmeric (QC TUMERIC COMPLEX PO) Take by mouth    Provider, MD Mitali   diclofenac sodium (VOLTAREN) 1 % GEL Apply 4 g topically 4 times daily as needed for Pain 9/7/22   Automatic Reconciliation, Ar   valACYclovir (VALTREX) 1 g tablet Take 1 tablet by mouth as needed ceived the following from [facility]: Outside name: valACYclovir (VALTREX) 1 gram tablet  Patient not taking: Reported on 9/4/2024 11/9/22   Automatic Reconciliation, Ar   zolpidem (AMBIEN) 10 MG tablet Take 0.5 tablets by mouth nightly as needed.  Patient not taking: Reported on 9/4/2024    Automatic Reconciliation, Ar     Family History   Problem Relation Age of Onset    Thyroid Disease Mother     Osteoarthritis Father     Osteoarthritis Sister     Osteoarthritis Mother     High Cholesterol Mother     Other Mother         graves    Cancer Father         prostate    Rashes/Skin Problems Sister        Allergies:   Allergies   Allergen Reactions    Duloxetine Nausea And Vomiting        Review of Systems:  A comprehensive review of systems was negative.    Objective:       Physical Exam:  General:  Alert, oriented, pleasant, well-groomed  HEENT:  Normocephalic, atraumatic  Lungs:   Clear to auscultation  Heart:    Regular rate and rhythm  Abdomen:  Soft, non-tender  Extremities:   Pain with range of motion of the affected knee    ROM: Limited    Crepitus with motion of affected knee    Mild effusion       Xrays:   Xrays demonstrate severe arthritic changes including sunchondral cysts, subchondral sclerosis, periarticular osteophytes, and joint space narrowing.    Assessment:      Arthritis of the affected knee. This has significantly

## 2024-09-04 NOTE — DISCHARGE INSTRUCTIONS
Total Knee Arthroplasty Discharge Instructions   Josh Damon M.D.      Please take the time to review the following instructions before you leave the hospital and use them as guidelines during your recovery from surgery. If you have any questions you may contact my office at [phone].     Wound Care / Dressing Changes:     Two days after your surgery date you should remove your dressing. A big, bulky dressing isn't necessary as long as there isn't any drainage from the incisions. If there is drainage you can put a band-aid, primapore, or mepilex dressing over the incision and change it daily until drainage stops. It isn't necessary to apply antibiotic ointment to your incisions. If you have glue over your incision do not peel it off.  If you have steri-strips over your incision they will start to peel off in 7-10 days. They don't need to be removed prior to that. When they begin to peel off you can remove them. They should all be removed by 14 days from you surgery. Keep a towel or gauze in any skin folds that may hang over the incision so that it stays dry.    Showering / Bathing:     You may only shower. You may shower if there is no drainage from your incisions. Your dressing may be removed for showering. You may get your incisions wet in the shower. Don't vigorously scrub the area where your incisions are. Apply a clean, dry dressing after drying off the area of your incisions. Don't take a tub bath, get in a swimming pool or Jacuzzi until the incisions are completely healed. Do not soak your incisions under water.     Weight Bearing Status / Braces:     ___X__ Weight bearing as tolerated. Use crutches, walker, or cane as needed for support. You may move your joints as much as tolerated.     _____  \"Toe Touch\" weight bearing. Don't bear weight on the leg you had operated on. Use your toes only to steady yourself as you ambulate with crutches or a walker.     _____ Avoid extreme hip extension with

## 2024-09-04 NOTE — PROGRESS NOTES
situation and support systems  []         Safety awareness  []         Pain tolerance/management  []         Other:      Recommendations and Planned Interventions:   Strengthening;ROM;Balance training;Functional mobility training;Transfer training;Endurance training;Gait training;Stair training;Neuromuscular re-education;Home exercise program;Safety education & training;Patient/Caregiver education & training;Equipment evaluation, education, & procurement;Therapeutic activities;Positioning    Frequency/Duration: Based on an AM-PAC score of 18/24 and their current functional mobility deficits, it is recommended that the patient have 2-3 sessions per week of Physical Therapy at d/c to increase the patient's independence.    Further Equipment Recommendations for Discharge:   No      [facility]: AM-PAC Inpatient Mobility Raw Score : 20      This AMPAC score should be considered in conjunction with interdisciplinary team recommendations to determine the most appropriate discharge setting. Patient's social support, diagnosis, medical stability, and prior level of function should also be taken into consideration.    Current research shows that an AM-PAC score of 17 (13 without stairs) or less is not associated with a discharge to the patient's home setting. Based on an AM-PAC score and their current functional mobility deficits, it is recommended that the patient have 5-7 sessions per week of Physical Therapy at d/c to increase the patient's independence.  Currently, this patient demonstrates the potential endurance, and/or tolerance for 3 hours of therapy each day at d/c.    Current research shows that an AM-PAC score of 17 (13 without stairs) or less is not associated with a discharge to the patient's home setting. Based on an AM-PAC score and their current functional mobility deficits, it is recommended that the patient have 3-5 sessions per week of Physical Therapy at d/c to increase the patient's independence.     Current  Sensation:   Tone: Normal  Sensation: Intact  Range Of Motion:  AROM: Generally decreased, functional  PROM: Generally decreased, functional  Functional Mobility:  Transfers:   Transfer Training  Transfer Training: Yes  Interventions: Tactile cues;Verbal cues;Demonstration  Sit to Stand: Contact-guard assistance  Stand to Sit: Contact-guard assistance  Balance:   Balance  Sitting: Intact  Standing: Impaired  Standing - Static: Good  Standing - Dynamic: Fair  Ambulation/Gait Training:  Gait Training  Right Side Weight Bearing: As tolerated  Gait  Gait Training: Yes  Right Side Weight Bearing: As tolerated  Overall Level of Assistance: Contact-guard assistance  Distance (ft): 100 Feet  Assistive Device: Gait belt;Walker, rolling  Interventions: Demonstration;Verbal cues  Base of Support: Shift to left  Speed/Deneen: Slow  Step Length: Left shortened;Right shortened  Stance: Time;Right decreased  Gait Abnormalities: Antalgic;Decreased step clearance;Step to gait  Rail Use: Both  Stairs - Level of Assistance: Contact-guard assistance  Number of Stairs Trained: 5  Pain:  Pain level pre-treatment: 7/10   Pain level post-treatment: 7/10   Pain Intervention(s): Medication (see MAR); Rest, Ice, Repositioning  Response to intervention: Nurse notified, See doc flow    Activity Tolerance:   Activity Tolerance: Patient tolerated evaluation without incident;Patient tolerated treatment well    After treatment:   [x]         Patient left in no apparent distress sitting up in chair  []         Patient left in no apparent distress in bed  [x]         Call bell left within reach  [x]         Nursing notified  []         Caregiver present  []         Bed alarm activated  []         SCDs applied    COMMUNICATION/EDUCATION:   Patient Education  Education Given To: Patient  Education Provided: Role of Therapy;Plan of Care;Home Exercise Program;Transfer Training;Equipment;Fall Prevention Strategies  Education Method:

## 2024-09-04 NOTE — ANESTHESIA PROCEDURE NOTES
Peripheral Block    Patient location during procedure: pre-op  Reason for block: post-op pain management  Start time: 9/4/2024 7:15 AM  Staffing  Performed: anesthesiologist   Anesthesiologist: John Crocker DO  Performed by: John Crocker DO  Authorized by: John Crocker DO    Preanesthetic Checklist  Completed: patient identified, IV checked, site marked, risks and benefits discussed, surgical/procedural consents, equipment checked, pre-op evaluation, timeout performed, anesthesia consent given, oxygen available, monitors applied/VS acknowledged, fire risk safety assessment completed and verbalized and blood product R/B/A discussed and consented  Peripheral Block   Patient position: supine  Prep: ChloraPrep  Provider prep: mask and sterile gloves  Patient monitoring: cardiac monitor, continuous pulse ox, frequent blood pressure checks, IV access, oxygen and responsive to questions  Block type: Saphenous  Laterality: right  Injection technique: single-shot  Guidance: ultrasound guided    Needle   Needle type: insulated echogenic nerve stimulator needle   Needle gauge: 20 G  Needle localization: ultrasound guidance  Needle length: 4 inch.  Assessment   Injection assessment: negative aspiration for heme, no paresthesia on injection, local visualized surrounding nerve on ultrasound and no intravascular symptoms  Paresthesia pain: none  Slow fractionated injection: yes  Hemodynamics: stable  Outcomes: uncomplicated and patient tolerated procedure well    Medications Administered  ropivacaine (NAROPIN) injection 0.5% - Perineural   10 mL - 9/4/2024 7:15:00 AM

## 2024-09-04 NOTE — PERIOP NOTE
TRANSFER - IN REPORT:    Verbal report received from PATEL Negrete (name) on Zachariah Nieves  being received from PACU (unit) for routine progression of patient care      Report consisted of patient’s Situation, Background, Assessment and   Recommendations(SBAR).     Information from the following report(s) Nurse Handoff Report, Surgery Report, Intake/Output, and MAR was reviewed with the receiving nurse.    Opportunity for questions and clarification was provided.      Assessment completed upon patient’s arrival to unit and care assume

## 2024-09-04 NOTE — ANESTHESIA PRE PROCEDURE
Department of Anesthesiology  Preprocedure Note       Name:  Zachariah Nieves   Age:  49 y.o.  :  1975                                          MRN:  [id number]         Date:  2024      Surgeon: Surgeon(s):  Josh Damon MD    Procedure: Procedure(s):  RIGHT TOTAL KNEE REPLACEMENT KAMERON    Medications prior to admission:   Prior to Admission medications    Medication Sig Start Date End Date Taking? Authorizing Provider   acetaminophen (TYLENOL) 500 MG tablet Take 1,300 mg by mouth every 6 hours as needed for Pain   Yes Provider, MD Mitali   oxyCODONE HCl (OXY-IR) 10 MG immediate release tablet Take 1 tablet by mouth every 6 hours as needed for Pain.   Yes Automatic Reconciliation, Ar   estrogens, conjugated, (PREMARIN) 0.9 MG tablet Take 1 tablet by mouth as needed  Patient not taking: Reported on 2024    ProviderMitali MD   Turmeric (QC TUMERIC COMPLEX PO) Take by mouth    ProviderMitali MD   diclofenac sodium (VOLTAREN) 1 % GEL Apply 4 g topically 4 times daily as needed for Pain 22   Automatic Reconciliation, Ar   valACYclovir (VALTREX) 1 g tablet Take 1 tablet by mouth as needed ceived the following from [facility]: Outside name: valACYclovir (VALTREX) 1 gram tablet  Patient not taking: Reported on 2024   Automatic Reconciliation, Ar   zolpidem (AMBIEN) 10 MG tablet Take 0.5 tablets by mouth nightly as needed.  Patient not taking: Reported on 2024    Automatic Reconciliation, Ar       Current medications:    Current Facility-Administered Medications   Medication Dose Route Frequency Provider Last Rate Last Admin   • lactated ringers IV soln infusion   IntraVENous Continuous Josh Damon  mL/hr at 24 0617 New Bag at 24 0617   • tranexamic acid-NaCl IVPB premix 1,000 mg  1,000 mg IntraVENous On Call to OR Josh Damon MD       • chlorhexidine gluconate (ANTISEPTIC SKIN CLEANSER) 4 % solution   Topical Daily PRN

## 2024-09-04 NOTE — PROGRESS NOTES
Occupational Therapy Goals:  Initiated 9/4/2024 to be met within 7-10 days.  Short Term Goals  Time Frame for Short Term Goals: 7 days  Short Term Goal 1: The patient will demonstrate ability to complete LB dressing tasks at supervision level with use of AE as needed.  Short Term Goal 2: The patient will demonstrate ability to complete LB bathing tasks at supervision level with use of AE as needed.  Short Term Goal 3: The patient will demonstrate ability to complete toilet transfers at supervision level.  Short Term Goal 4: The patient will demonstrate ability to complete toileting tasks at supervision level.  Short Term Goal 5: The patient will demonstrate ability to complete grooming tasks standing at sink at supervision level.    OCCUPATIONAL THERAPY EVALUATION    Patient: Zachariah Nieves (49 y.o. female)  Date: 9/4/2024  Primary Diagnosis: Osteoarthritis of right knee, unspecified osteoarthritis type [M17.11]  Procedure(s) (LRB):  RIGHT TOTAL KNEE REPLACEMENT KAMERON (Right) Day of Surgery   Precautions: Fall Risk, Weight Bearing, Surgical Protocols, Right Lower Extremity Weight Bearing: Weight Bearing As Tolerated,   PLOF: Pt was independent in ADLs and ambulated independently without AD.     ASSESSMENT : Pt cleared for therapy evaluation by RN. Upon therapist's arrival, pt was seated in recliner reporting 7/10 pain in R knee. Pt was agreeable to occupational therapy evaluation. Pt seen with PT for second set of skilled hands. OT educated pt regarding the role of occupational therapy. Pt verbalized 100% understanding. OT educated pt regarding knee precautions and weight bearing status s/p R TKA. OT educated pt regarding  the intended wear schedule for compression stockings s/p surgery. Pt verbalized 100% understanding. OT educated pt regarding use of adaptive ADL strategies to improve ease and safety with dressing tasks. Pt verbalized 100% understanding. Pt completed UB dressing tasks with supervision and LB  Standard  Bathroom Equipment: Toilet raiser, Shower chair  Home Equipment: Walker - Rolling  Receives Help From: Family  ADL Assistance: Independent  Ambulation Assistance: Independent  []  Right hand dominant   []  Left hand dominant    Cognitive/Behavioral Status:  Orientation Level: Oriented to place;Oriented to situation;Oriented to person  WFL                                 Vision/Perceptual:    Vision: Within Functional Limits                          Coordination: BUE  Coordination: Generally decreased, functional     Coordination: Within functional limits      Balance:     Balance  Sitting: Intact  Standing: Impaired  Standing - Static: Constant support;Fair  Standing - Dynamic: Constant support;Fair    Strength: BUE  Strength: Generally decreased, functional  Strength: Generally decreased, functional    Tone & Sensation: BUE  Tone: Normal  Sensation: Intact          Range of Motion: BUE  AROM: Generally decreased, functional  PROM: Generally decreased, functional  AROM: Generally decreased, functional  PROM: Generally decreased, functional      Functional Mobility and Transfers for ADLs:  Bed Mobility:     Bed Mobility Training  Bed Mobility Training: No  Transfers:                 Transfer Training  Transfer Training: Yes  Interventions: Safety awareness training;Tactile cues;Verbal cues  Sit to Stand: Contact-guard assistance  Stand to Sit: Contact-guard assistance    ADL Assessment:                  UE Dressing: Supervision  LE Dressing: Minimal assistance       ADL Intervention:    Therapeutic Exercise/Neuromuscular Re-education:    Pain:  Pain level pre-treatment: 7/10   Pain level post-treatment: NR/10   Pain Intervention(s): Rest, Ice, Repositioning   Response to intervention: Nurse notified regarding pt's performance with therapy.     Activity Tolerance:   Activity Tolerance: Patient Tolerated treatment well  Please refer to the flowsheet for vital signs taken during this treatment.    After

## 2024-09-04 NOTE — INTERVAL H&P NOTE
Update History & Physical    The patient's History and Physical was reviewed with the patient and I examined the patient. There was no change. The surgical site was confirmed by the patient and me.     Plan: The risks, benefits, expected outcome, and alternative to the recommended procedure have been discussed with the patient. Patient understands and wants to proceed with the procedure.     Electronically signed by LATOYA GOOD MD on 9/4/2024 at 7:13 AM

## 2024-09-04 NOTE — PERIOP NOTE
Notified Dr. Crocker that patient continues to be in pain after receiving all pain medications ordered. Order received to give 0.5mg of dilaudid.

## 2024-09-04 NOTE — ANESTHESIA POSTPROCEDURE EVALUATION
Department of Anesthesiology  Postprocedure Note    Patient: Zachariah Nieves  MRN: [id number]  YOB: 1975  Date of evaluation: 9/4/2024    Procedure Summary       Date: 09/04/24 Room / Location: [facility] 05 / [facility] MAIN OR    Anesthesia Start: 0728 Anesthesia Stop: 0943    Procedure: RIGHT TOTAL KNEE REPLACEMENT KAMERON (Right: Knee) Diagnosis:       Osteoarthritis of right knee, unspecified osteoarthritis type      (Osteoarthritis of right knee, unspecified osteoarthritis type [M17.11])    Surgeons: Josh Damon MD Responsible Provider: John Crocker DO    Anesthesia Type: General, Regional ASA Status: 2            Anesthesia Type: General, Regional    [place]id Phase I: [place]id Score: 10    [place] Phase II:      Anesthesia Post Evaluation    Patient location during evaluation: PACU  Patient participation: complete - patient participated  Level of consciousness: awake and alert  Pain score: 5  Airway patency: patent  Nausea & Vomiting: no nausea and no vomiting  Cardiovascular status: hemodynamically stable  Respiratory status: acceptable and spontaneous ventilation  Hydration status: euvolemic  Multimodal analgesia pain management approach  Pain management: satisfactory to patient    No notable events documented.

## 2024-09-04 NOTE — OP NOTE
KAMERON TOTAL KNEE ARTHROPLASTY OP NOTE      OPERATIVE REPORT    Patient: Zachariah Nieves CSN: [id number] SSN: [id number]    YOB: 1975  Age: 49 y.o.  Sex: female      Admit Date: 9/4/2024  Admit Diagnosis: Osteoarthritis of right knee, unspecified osteoarthritis type [M17.11]    Date of Procedure: 9/4/2024   Preoperative Diagnosis: Pre-Op Diagnosis Codes:      * Osteoarthritis of right knee, unspecified osteoarthritis type [M17.11]  Postoperative Diagnosis: Post-Op Diagnosis Codes:     * Osteoarthritis of right knee, unspecified osteoarthritis type [M17.11]    Procedure: RIGHT TOTAL KNEE REPLACEMENT KAMERON: 05895 (CPT®)  Surgeon: LATOYA GOOD MD  Assistant(s):  Jorge Luis Pearson PA-C  Anesthesia: General   Estimated Blood Loss:<50CC  Specimens: * No specimens in log *   Complications: None  Implants:   Implant Name Type Inv. Item Serial No.  Lot No. LRB No. Used Action   TRIATHLON CR X3 TIBIAL INSERT - [id number]  TRIATHLON CR X3 TIBIAL INSERT  [employer] iPling [id number] Right 1 Implanted   BASEPLATE TIB SZ 5 AP49MM ML74MM KNEE TRITANIUM 4 CRUCFRM - [id number]  BASEPLATE TIB SZ 5 AP49MM ML74MM KNEE TRITANIUM 4 CRUCFRM  [employer] i[employer]ling [id number] Right 1 Implanted   COMPONENT PAT [id number] SUPERIOR/INFERIOR KNEE - [id number]  COMPONENT PAT [id number] SUPERIOR/INFERIOR KNEE  [employer] i[employer]ling [id number] Right 1 Implanted   COMPONENT FEM SZ 4 R KNEE CRUCE RET CEMENTLESS BEAD W/ CHLOÉ - [id number]  COMPONENT FEM SZ 4 R KNEE CRUCE RET CEMENTLESS BEAD W/ CHLOÉ  [employer] i[employer]ling [id number] Right 1 Implanted         INDICATIONS: The patient is a 49 y.o., female who has who has been suffering from knee arthritis. She has failed conservative therapy including activity modification, anti-inflammatories and injections. The arthritis is significantly impacting the patient's quality of life. We did discuss the option of total knee arthroplasty with them at length.  flexion, full extension and good stability throughout. The PCL was noted to be intact. The patella was sized to the appropriate button. The keel holes were drilled, and the poly was placed. The patella tracked nicely throughout full range of motion. The trial implants were then removed. The bone ends were irrigated and dried. The final implants were then pressed into place beginning with the tibia, followed by the femur, and lastly the patella. The polyethylene spacer was locked into the tibial tray. The tourniquet was deflated. Bleeding was controlled with electrocautery. Bleeding was not felt to be excessive enough to warrant a drain. The capsule was closed with a #1 Vicryl in a figure-of-eight interrupted fashion. Subcutaneous tissue was closed with 2-0 Vicryl. The skin was closed with 3-0 Monocryl in a running subcuticular fashion. Steri-strips and a sterile compressive dressing was applied.     The patient was awakened and transferred to the recovery in stable condition. All needle and sponge counts were reported as correct at the end of the case.

## 2024-09-04 NOTE — PERIOP NOTE
TRANSFER - IN REPORT:    Verbal report received from OR Nurse and CRNA on Zachariah Nieves  being received from OR for routine post-op      Report consisted of patient's Situation, Background, Assessment and   Recommendations(SBAR).     Information from the following report(s) Nurse Handoff Report, Adult Overview, Surgery Report, Intake/Output, MAR, and Recent Results was reviewed with the receiving nurse.    Opportunity for questions and clarification was provided.      Assessment completed upon patient's arrival to unit and care assumed.

## 2024-09-04 NOTE — PERIOP NOTE
TRANSFER - OUT REPORT:    Verbal report given to Adriana SWEENEY on Zachariah Nieves  being transferred to Phase 2 for routine post-op       Report consisted of patient's Situation, Background, Assessment and   Recommendations(SBAR).     Information from the following report(s) Nurse Handoff Report, Adult Overview, Surgery Report, Intake/Output, MAR, and Recent Results was reviewed with the receiving nurse.           Lines:   Peripheral IV 09/04/24 Left;Posterior Hand (Active)   Site Assessment Clean, dry & intact 09/04/24 1053   Line Status Infusing 09/04/24 1053   Line Care Connections checked and tightened 09/04/24 0729   Phlebitis Assessment No symptoms 09/04/24 1053   Infiltration Assessment 0 09/04/24 1053   Dressing Status Clean, dry & intact 09/04/24 1053   Dressing Type Transparent 09/04/24 1053        Opportunity for questions and clarification was provided.      Patient transported with:  Registered Nurse

## (undated) DEVICE — PIN FIX L3.5IN DIA1/8IN S STL FLUT HDLSS SQ END

## (undated) DEVICE — ZIMMER® STERILE DISPOSABLE TOURNIQUET CUFF WITH PROTECTIVE SLEEVE AND PLC, SINGLE PORT, SINGLE BLADDER, 34 IN. (86 CM)

## (undated) DEVICE — SUTURE VICRYL + SZ 1 L36IN ABSRB UD L36MM CT-1 1/2 CIR VCP947H

## (undated) DEVICE — NEEDLE SPNL 20GA L3.5IN YEL HUB S STL REG WALL FIT STYL

## (undated) DEVICE — DRESSING FOAM POST OPERATIVE 30X10 CM MEPLIX BORDER

## (undated) DEVICE — INTENDED FOR TISSUE SEPARATION, AND OTHER PROCEDURES THAT REQUIRE A SHARP SURGICAL BLADE TO PUNCTURE OR CUT.: Brand: BARD-PARKER ® CARBON RIB-BACK BLADES

## (undated) DEVICE — BOOT POS LEG DEMAYO

## (undated) DEVICE — CORD RETRCT SIL - ORDER MULTIPLES OF 10 EACH

## (undated) DEVICE — GARMENT COMPR M FOR 13IN FT INTMIT SGL BLDR HEM FORC II

## (undated) DEVICE — 3 BONE CEMENT MIXER: Brand: MIXEVAC

## (undated) DEVICE — ELECTRODE PT RET AD L9FT HI MOIST COND ADH HYDRGEL CORDED

## (undated) DEVICE — BANDAGE COBAN 6 IN WND 6INX5YD FOAM

## (undated) DEVICE — KIT DRP FOR RIO ROBOTIC ARM ASST SYS

## (undated) DEVICE — PACK PROCEDURE SURG TOT KNEE CUST

## (undated) DEVICE — ADHESIVE SKIN CLOSURE WND 8.661X1.5 IN 22 CM LIQUIBAND SECUR

## (undated) DEVICE — KIT INT FIX FEM TIB CKPT MAKOPLASTY

## (undated) DEVICE — BLADE SURG SAW STD S STL OSC W/ SERR EDGE DISP

## (undated) DEVICE — CONCISE CEMENT SCULPS KIT: Brand: CONCISE

## (undated) DEVICE — SUTURE VICRYL + SZ 2-0 L27IN ABSRB UD CT-2 L26MM 1/2 CIR TAPR VCP269H

## (undated) DEVICE — SOLUTION IRRIG 500ML 0.9% SOD CHLO USP POUR PLAS BTL

## (undated) DEVICE — KIT TRK KNEE PROC VIZADISC

## (undated) DEVICE — APPLICATOR MEDICATED 26 CC SOLUTION HI LT ORNG CHLORAPREP

## (undated) DEVICE — SOLUTION IRRIG 3000ML LAC R FLX CONT

## (undated) DEVICE — SUTURE MONOCRYL SZ 3-0 L27IN ABSRB UD L24MM PS-1 3/8 CIR PRIM Y936H